# Patient Record
Sex: MALE | Race: WHITE | Employment: FULL TIME
[De-identification: names, ages, dates, MRNs, and addresses within clinical notes are randomized per-mention and may not be internally consistent; named-entity substitution may affect disease eponyms.]

---
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
## 2022-03-09 ENCOUNTER — APPOINTMENT (OUTPATIENT)
Dept: CT IMAGING | Age: 22
DRG: 135 | End: 2022-03-09
Payer: MEDICARE

## 2022-03-09 ENCOUNTER — HOSPITAL ENCOUNTER (INPATIENT)
Age: 22
LOS: 1 days | Discharge: HOME | DRG: 135 | End: 2022-03-10
Attending: EMERGENCY MEDICINE | Admitting: SURGERY
Payer: MEDICARE

## 2022-03-09 ENCOUNTER — APPOINTMENT (OUTPATIENT)
Dept: GENERAL RADIOLOGY | Age: 22
DRG: 135 | End: 2022-03-09
Payer: MEDICARE
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
## 2022-03-09 PROBLEM — J93.9 PNEUMOTHORAX: Status: ACTIVE | Noted: 2022-03-09

## 2022-03-09 LAB
ABO/RH: NORMAL
ANION GAP SERPL CALCULATED.3IONS-SCNC: 15 MMOL/L (ref 9–17)
ANTIBODY SCREEN: NEGATIVE
ARM BAND NUMBER: NORMAL
BLOOD BANK SPECIMEN: ABNORMAL
BUN BLDV-MCNC: 14 MG/DL (ref 6–20)
CARBOXYHEMOGLOBIN: 0.7 % (ref 0–5)
CHLORIDE BLD-SCNC: 104 MMOL/L (ref 98–107)
CO2: 21 MMOL/L (ref 20–31)
CREAT SERPL-MCNC: 1.05 MG/DL (ref 0.7–1.2)
ETHANOL PERCENT: <0.01 %
ETHANOL: <10 MG/DL
EXPIRATION DATE: NORMAL
FIO2: ABNORMAL
GLUCOSE BLD-MCNC: 119 MG/DL (ref 70–99)
HCG QUALITATIVE: ABNORMAL
HCO3 VENOUS: 22.1 MMOL/L (ref 24–30)
HCT VFR BLD CALC: 43.6 % (ref 40.7–50.3)
HEMOGLOBIN: 14.9 G/DL (ref 13–17)
INR BLD: 1.1
MCH RBC QN AUTO: 28.6 PG (ref 25.2–33.5)
MCHC RBC AUTO-ENTMCNC: 34.2 G/DL (ref 28.4–34.8)
MCV RBC AUTO: 83.7 FL (ref 82.6–102.9)
NEGATIVE BASE EXCESS, VEN: 2.2 MMOL/L (ref 0–2)
NRBC AUTOMATED: 0 PER 100 WBC
O2 SAT, VEN: 91.3 % (ref 60–85)
PARTIAL THROMBOPLASTIN TIME: 23.3 SEC (ref 20.5–30.5)
PATIENT TEMP: 37
PCO2, VEN: 38.3 (ref 39–55)
PDW BLD-RTO: 12.3 % (ref 11.8–14.4)
PH VENOUS: 7.38 (ref 7.32–7.42)
PLATELET # BLD: 231 K/UL (ref 138–453)
PMV BLD AUTO: 9.4 FL (ref 8.1–13.5)
PO2, VEN: 60.6 (ref 30–50)
POTASSIUM SERPL-SCNC: 3.2 MMOL/L (ref 3.7–5.3)
PROTHROMBIN TIME: 11.5 SEC (ref 9.1–12.3)
RBC # BLD: 5.21 M/UL (ref 4.21–5.77)
SARS-COV-2, RAPID: NOT DETECTED
SODIUM BLD-SCNC: 140 MMOL/L (ref 135–144)
SPECIMEN DESCRIPTION: NORMAL
WBC # BLD: 9.9 K/UL (ref 4.5–13.5)

## 2022-03-09 PROCEDURE — 73552 X-RAY EXAM OF FEMUR 2/>: CPT

## 2022-03-09 PROCEDURE — 6810039001 HC L1 TRAUMA PRIORITY

## 2022-03-09 PROCEDURE — 72125 CT NECK SPINE W/O DYE: CPT

## 2022-03-09 PROCEDURE — 6360000004 HC RX CONTRAST MEDICATION: Performed by: STUDENT IN AN ORGANIZED HEALTH CARE EDUCATION/TRAINING PROGRAM

## 2022-03-09 PROCEDURE — 1200000000 HC SEMI PRIVATE

## 2022-03-09 PROCEDURE — 73562 X-RAY EXAM OF KNEE 3: CPT

## 2022-03-09 PROCEDURE — 99285 EMERGENCY DEPT VISIT HI MDM: CPT

## 2022-03-09 PROCEDURE — 70450 CT HEAD/BRAIN W/O DYE: CPT

## 2022-03-09 PROCEDURE — 71260 CT THORAX DX C+: CPT

## 2022-03-09 PROCEDURE — 3209999900 CT THORACIC SPINE TRAUMA RECONSTRUCTION

## 2022-03-09 PROCEDURE — 3209999900 CT LUMBAR SPINE TRAUMA RECONSTRUCTION

## 2022-03-09 RX ORDER — ONDANSETRON 2 MG/ML
INJECTION INTRAMUSCULAR; INTRAVENOUS
Status: DISPENSED
Start: 2022-03-09 | End: 2022-03-10

## 2022-03-09 RX ORDER — ONDANSETRON 2 MG/ML
INJECTION INTRAMUSCULAR; INTRAVENOUS
Status: COMPLETED
Start: 2022-03-09 | End: 2022-03-10

## 2022-03-09 RX ORDER — FENTANYL CITRATE 50 UG/ML
INJECTION, SOLUTION INTRAMUSCULAR; INTRAVENOUS
Status: DISPENSED
Start: 2022-03-09 | End: 2022-03-10

## 2022-03-09 RX ADMIN — IOPAMIDOL 130 ML: 755 INJECTION, SOLUTION INTRAVENOUS at 21:48

## 2022-03-09 ASSESSMENT — ENCOUNTER SYMPTOMS
BACK PAIN: 0
COUGH: 0
ABDOMINAL PAIN: 1
SHORTNESS OF BREATH: 0
VOMITING: 0
NAUSEA: 0
CHEST TIGHTNESS: 0
SINUS PAIN: 0

## 2022-03-09 NOTE — LETTER
[facility]  [address] [facility] [place] [address]  Phone: [phone]        March 10, 2022     Patient: Mike Mendoza   YOB: 2000   Date of Visit: 3/9/2022       To Whom It May Concern: It is my medical opinion that Theresa Brink should remain out of work until Wednesday March 16th, 2022. May return sooner if symptoms have improved. If additional documentation is needed, please contact our office. [address] [facility], [address]  [phone] (V)  [phone] (f)      If you have any questions or concerns, please don't hesitate to call.     Sincerely,      Herlinda Cyr DO/Tico Means RN, Trauma Coordinator

## 2022-03-10 ENCOUNTER — APPOINTMENT (OUTPATIENT)
Dept: GENERAL RADIOLOGY | Age: 22
DRG: 135 | End: 2022-03-10
Payer: MEDICARE

## 2022-03-10 VITALS
RESPIRATION RATE: 16 BRPM | SYSTOLIC BLOOD PRESSURE: 117 MMHG | WEIGHT: 135 LBS | BODY MASS INDEX: 22.49 KG/M2 | OXYGEN SATURATION: 100 % | TEMPERATURE: 98.3 F | HEIGHT: 65 IN | DIASTOLIC BLOOD PRESSURE: 49 MMHG | HEART RATE: 105 BPM

## 2022-03-10 PROBLEM — J93.9 PNEUMOTHORAX: Status: RESOLVED | Noted: 2022-03-09 | Resolved: 2022-03-10

## 2022-03-10 LAB
ABSOLUTE EOS #: <0.03 K/UL (ref 0–0.44)
ABSOLUTE IMMATURE GRANULOCYTE: 0.03 K/UL (ref 0–0.3)
ABSOLUTE LYMPH #: 2.04 K/UL (ref 1.2–5.2)
ABSOLUTE MONO #: 1.03 K/UL (ref 0.1–1.4)
ANION GAP SERPL CALCULATED.3IONS-SCNC: 12 MMOL/L (ref 9–17)
BASOPHILS # BLD: 0 % (ref 0–2)
BASOPHILS ABSOLUTE: 0.04 K/UL (ref 0–0.2)
BUN BLDV-MCNC: 11 MG/DL (ref 6–20)
CALCIUM SERPL-MCNC: 9.6 MG/DL (ref 8.6–10.4)
CHLORIDE BLD-SCNC: 104 MMOL/L (ref 98–107)
CO2: 23 MMOL/L (ref 20–31)
CREAT SERPL-MCNC: 0.81 MG/DL (ref 0.7–1.2)
EOSINOPHILS RELATIVE PERCENT: 0 % (ref 1–4)
GFR NON-AFRICAN AMERICAN: ABNORMAL ML/MIN
GFR SERPL CREATININE-BSD FRML MDRD: ABNORMAL ML/MIN/{1.73_M2}
GLUCOSE BLD-MCNC: 102 MG/DL (ref 70–99)
HCT VFR BLD CALC: 46.6 % (ref 40.7–50.3)
HEMOGLOBIN: 15.4 G/DL (ref 13–17)
IMMATURE GRANULOCYTES: 0 %
LYMPHOCYTES # BLD: 19 % (ref 25–45)
MCH RBC QN AUTO: 28.9 PG (ref 25.2–33.5)
MCHC RBC AUTO-ENTMCNC: 33 G/DL (ref 28.4–34.8)
MCV RBC AUTO: 87.4 FL (ref 82.6–102.9)
MONOCYTES # BLD: 10 % (ref 2–8)
NRBC AUTOMATED: 0 PER 100 WBC
PDW BLD-RTO: 12.7 % (ref 11.8–14.4)
PLATELET # BLD: 214 K/UL (ref 138–453)
PMV BLD AUTO: 9.1 FL (ref 8.1–13.5)
POTASSIUM SERPL-SCNC: 4.2 MMOL/L (ref 3.7–5.3)
RBC # BLD: 5.33 M/UL (ref 4.21–5.77)
SEG NEUTROPHILS: 71 % (ref 34–64)
SEGMENTED NEUTROPHILS ABSOLUTE COUNT: 7.51 K/UL (ref 1.8–8)
SODIUM BLD-SCNC: 139 MMOL/L (ref 135–144)
WBC # BLD: 10.7 K/UL (ref 4.5–13.5)

## 2022-03-10 PROCEDURE — 97530 THERAPEUTIC ACTIVITIES: CPT

## 2022-03-10 PROCEDURE — 97535 SELF CARE MNGMENT TRAINING: CPT

## 2022-03-10 PROCEDURE — 80048 BASIC METABOLIC PNL TOTAL CA: CPT

## 2022-03-10 PROCEDURE — 6360000002 HC RX W HCPCS

## 2022-03-10 PROCEDURE — 71045 X-RAY EXAM CHEST 1 VIEW: CPT

## 2022-03-10 PROCEDURE — 92523 SPEECH SOUND LANG COMPREHEN: CPT

## 2022-03-10 PROCEDURE — 85025 COMPLETE CBC W/AUTO DIFF WBC: CPT

## 2022-03-10 PROCEDURE — 36415 COLL VENOUS BLD VENIPUNCTURE: CPT

## 2022-03-10 PROCEDURE — 97165 OT EVAL LOW COMPLEX 30 MIN: CPT

## 2022-03-10 PROCEDURE — 73030 X-RAY EXAM OF SHOULDER: CPT

## 2022-03-10 PROCEDURE — 97161 PT EVAL LOW COMPLEX 20 MIN: CPT

## 2022-03-10 PROCEDURE — 6370000000 HC RX 637 (ALT 250 FOR IP): Performed by: STUDENT IN AN ORGANIZED HEALTH CARE EDUCATION/TRAINING PROGRAM

## 2022-03-10 RX ORDER — METHOCARBAMOL 750 MG/1
750 TABLET, FILM COATED ORAL EVERY 6 HOURS
Status: DISCONTINUED | OUTPATIENT
Start: 2022-03-10 | End: 2022-03-10 | Stop reason: HOSPADM

## 2022-03-10 RX ORDER — SODIUM CHLORIDE 9 MG/ML
25 INJECTION, SOLUTION INTRAVENOUS PRN
Status: DISCONTINUED | OUTPATIENT
Start: 2022-03-10 | End: 2022-03-10 | Stop reason: HOSPADM

## 2022-03-10 RX ORDER — ACETAMINOPHEN 500 MG
1000 TABLET ORAL EVERY 8 HOURS PRN
Qty: 42 TABLET | Refills: 0 | Status: SHIPPED | OUTPATIENT
Start: 2022-03-10 | End: 2022-03-17

## 2022-03-10 RX ORDER — SENNA AND DOCUSATE SODIUM 50; 8.6 MG/1; MG/1
1 TABLET, FILM COATED ORAL 2 TIMES DAILY
Status: DISCONTINUED | OUTPATIENT
Start: 2022-03-10 | End: 2022-03-10 | Stop reason: HOSPADM

## 2022-03-10 RX ORDER — ONDANSETRON 4 MG/1
4 TABLET, ORALLY DISINTEGRATING ORAL EVERY 8 HOURS PRN
Status: DISCONTINUED | OUTPATIENT
Start: 2022-03-10 | End: 2022-03-10 | Stop reason: HOSPADM

## 2022-03-10 RX ORDER — POLYETHYLENE GLYCOL 3350 17 G/17G
17 POWDER, FOR SOLUTION ORAL DAILY
Status: DISCONTINUED | OUTPATIENT
Start: 2022-03-10 | End: 2022-03-10 | Stop reason: HOSPADM

## 2022-03-10 RX ORDER — SODIUM CHLORIDE 0.9 % (FLUSH) 0.9 %
5-40 SYRINGE (ML) INJECTION PRN
Status: DISCONTINUED | OUTPATIENT
Start: 2022-03-10 | End: 2022-03-10 | Stop reason: HOSPADM

## 2022-03-10 RX ORDER — GABAPENTIN 300 MG/1
300 CAPSULE ORAL EVERY 8 HOURS
Status: DISCONTINUED | OUTPATIENT
Start: 2022-03-10 | End: 2022-03-10 | Stop reason: HOSPADM

## 2022-03-10 RX ORDER — METHOCARBAMOL 750 MG/1
750 TABLET, FILM COATED ORAL 3 TIMES DAILY PRN
Qty: 21 TABLET | Refills: 0 | Status: SHIPPED | OUTPATIENT
Start: 2022-03-10 | End: 2022-03-17

## 2022-03-10 RX ORDER — ONDANSETRON 2 MG/ML
4 INJECTION INTRAMUSCULAR; INTRAVENOUS EVERY 6 HOURS PRN
Status: DISCONTINUED | OUTPATIENT
Start: 2022-03-10 | End: 2022-03-10 | Stop reason: HOSPADM

## 2022-03-10 RX ORDER — SODIUM CHLORIDE 0.9 % (FLUSH) 0.9 %
5-40 SYRINGE (ML) INJECTION EVERY 12 HOURS SCHEDULED
Status: DISCONTINUED | OUTPATIENT
Start: 2022-03-10 | End: 2022-03-10 | Stop reason: HOSPADM

## 2022-03-10 RX ORDER — GABAPENTIN 300 MG/1
300 CAPSULE ORAL 3 TIMES DAILY
Qty: 21 CAPSULE | Refills: 0 | Status: SHIPPED | OUTPATIENT
Start: 2022-03-10 | End: 2022-03-17

## 2022-03-10 RX ORDER — ACETAMINOPHEN 500 MG
1000 TABLET ORAL EVERY 8 HOURS SCHEDULED
Status: DISCONTINUED | OUTPATIENT
Start: 2022-03-10 | End: 2022-03-10 | Stop reason: HOSPADM

## 2022-03-10 RX ADMIN — GABAPENTIN 300 MG: 300 CAPSULE ORAL at 02:29

## 2022-03-10 RX ADMIN — ACETAMINOPHEN 1000 MG: 500 TABLET ORAL at 06:33

## 2022-03-10 RX ADMIN — ONDANSETRON 4 MG: 2 INJECTION INTRAMUSCULAR; INTRAVENOUS at 00:51

## 2022-03-10 RX ADMIN — DOCUSATE SODIUM 50 MG AND SENNOSIDES 8.6 MG 1 TABLET: 8.6; 5 TABLET, FILM COATED ORAL at 02:29

## 2022-03-10 RX ADMIN — METHOCARBAMOL TABLETS 750 MG: 750 TABLET, COATED ORAL at 02:29

## 2022-03-10 RX ADMIN — METHOCARBAMOL TABLETS 750 MG: 750 TABLET, COATED ORAL at 06:33

## 2022-03-10 ASSESSMENT — PAIN SCALES - GENERAL
PAINLEVEL_OUTOF10: 5
PAINLEVEL_OUTOF10: 6
PAINLEVEL_OUTOF10: 2
PAINLEVEL_OUTOF10: 6
PAINLEVEL_OUTOF10: 3

## 2022-03-10 ASSESSMENT — PAIN DESCRIPTION - LOCATION
LOCATION: NECK
LOCATION: NECK

## 2022-03-10 ASSESSMENT — PATIENT HEALTH QUESTIONNAIRE - PHQ9: SUM OF ALL RESPONSES TO PHQ QUESTIONS 1-9: 7

## 2022-03-10 ASSESSMENT — PAIN DESCRIPTION - PAIN TYPE: TYPE: ACUTE PAIN
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
## 2022-03-10 NOTE — CARE COORDINATION
Case Management Initial Discharge Plan  Francis Anaya,             Met with:patient & mother to discuss discharge plans. Information verified: address, contacts, phone number, , insurance Yes  Insurance Provider: Saeid adv and car insurance    Emergency Contact/Next of Kin name & number: mother Anderson at [phone]  Who are involved in patient's support system? Mother and father, per mother, \"goes back and forth to each parents house. \"    PCP: Suha Vitale, APRN - CNP  Date of last visit: last year, pt is changing doctor's to [facility]. Discharge Planning    Living Arrangements:  Parent     Home has 1 stories  3 stairs to climb to get into front door,   Location of bedroom/bathroom in home main floor couch    Patient able to perform ADL's:Independent    Current Services (outpatient & in home) none  DME equipment: n/a  DME provider: n/a    Is patient receiving oral anticoagulation therapy? No    If indicated:   Physician managing anticoagulation treatment: n/a  Where does patient obtain lab work for ATC treatment? n/a      Potential Assistance Needed:  N/A    Patient agreeable to home care: No  [place]andler of choice provided:  no    Prior SNF/Rehab Placement and Facility: none  Agreeable to SNF/Rehab: No  Chandler of choice provided: no     Evaluation: yes    Expected Discharge date:  03/10/22    Patient expects to be discharged to: If home: is the family and/or caregiver wiling & able to provide support at home? yes  Who will be providing this support? Mother and father, different residences    Follow Up Appointment: Best Day/ Time:      Transportation provider: Mother  Transportation arrangements needed for discharge: No    Readmission Risk              Risk of Unplanned Readmission:  6             Does patient have a readmission risk score greater than 14?: No  If yes, follow-up appointment must be made within 7 days of discharge.      Goals of Care:       Educated pt and
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
## 2022-03-10 NOTE — PROGRESS NOTES
CLINICAL PHARMACY NOTE: MEDS TO BEDS    Total # of Prescriptions Filled: 3   The following medications were delivered to the patient:  · Robaxin 750mg tablet  · Tylenol 500mg tablet  · Gabapentin 300mg capsules    Additional Documentation: medications delivered to the pt and mom in room 246 on 03.10.22 at 16:38, no co pay.
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
## 2022-03-10 NOTE — PLAN OF CARE
Problem: Pain:  Goal: Pain level will decrease  Description: Pain level will decrease  3/10/2022 1701 by Gene Bush RN  Outcome: Completed  3/10/2022 0346 by Dorys Salazar RN  Outcome: Ongoing  Goal: Control of acute pain  Description: Control of acute pain  3/10/2022 1701 by Gene Bush RN  Outcome: Completed  3/10/2022 0346 by Dorys Salazar RN  Outcome: Ongoing  Goal: Control of chronic pain  Description: Control of chronic pain  3/10/2022 1701 by Gene Bush RN  Outcome: Completed  3/10/2022 0346 by Dorys Salazar RN  Outcome: Ongoing     Problem: Falls - Risk of:  Goal: Will remain free from falls  Description: Will remain free from falls  3/10/2022 1701 by Gene Bush RN  Outcome: Completed  3/10/2022 0346 by Dorys Salazar RN  Outcome: Met This Shift  Goal: Absence of physical injury  Description: Absence of physical injury  3/10/2022 1701 by Gene Bush RN  Outcome: Completed  3/10/2022 0346 by Dorys Salazar RN  Outcome: Met This Shift     Problem: Musculor/Skeletal Functional Status  Goal: Highest potential functional level  Outcome: Completed
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
## 2023-01-20 ENCOUNTER — OFFICE VISIT (OUTPATIENT)
Dept: PRIMARY CARE CLINIC | Age: 23
End: 2023-01-20
Payer: MEDICARE

## 2023-01-20 VITALS
HEART RATE: 68 BPM | OXYGEN SATURATION: 100 % | SYSTOLIC BLOOD PRESSURE: 116 MMHG | DIASTOLIC BLOOD PRESSURE: 66 MMHG | WEIGHT: 138.6 LBS | BODY MASS INDEX: 23.06 KG/M2

## 2023-01-20 DIAGNOSIS — Z76.89 ENCOUNTER TO ESTABLISH CARE: ICD-10-CM

## 2023-01-20 DIAGNOSIS — F41.8 ANXIETY WITH DEPRESSION: Primary | ICD-10-CM

## 2023-01-20 PROCEDURE — G8420 CALC BMI NORM PARAMETERS: HCPCS | Performed by: NURSE PRACTITIONER

## 2023-01-20 PROCEDURE — 99203 OFFICE O/P NEW LOW 30 MIN: CPT | Performed by: NURSE PRACTITIONER

## 2023-01-20 PROCEDURE — G8484 FLU IMMUNIZE NO ADMIN: HCPCS | Performed by: NURSE PRACTITIONER

## 2023-01-20 PROCEDURE — G8427 DOCREV CUR MEDS BY ELIG CLIN: HCPCS | Performed by: NURSE PRACTITIONER

## 2023-01-20 PROCEDURE — 1036F TOBACCO NON-USER: CPT | Performed by: NURSE PRACTITIONER

## 2023-01-20 RX ORDER — LANOLIN ALCOHOL/MO/W.PET/CERES
CREAM (GRAM) TOPICAL
COMMUNITY
Start: 2009-10-06

## 2023-01-20 RX ORDER — CITALOPRAM 10 MG/1
10 TABLET ORAL DAILY
Qty: 30 TABLET | Refills: 1 | Status: SHIPPED | OUTPATIENT
Start: 2023-01-20

## 2023-01-20 SDOH — ECONOMIC STABILITY: FOOD INSECURITY: WITHIN THE PAST 12 MONTHS, THE FOOD YOU BOUGHT JUST DIDN'T LAST AND YOU DIDN'T HAVE MONEY TO GET MORE.: NEVER TRUE

## 2023-01-20 SDOH — ECONOMIC STABILITY: FOOD INSECURITY: WITHIN THE PAST 12 MONTHS, YOU WORRIED THAT YOUR FOOD WOULD RUN OUT BEFORE YOU GOT MONEY TO BUY MORE.: NEVER TRUE

## 2023-01-20 ASSESSMENT — PATIENT HEALTH QUESTIONNAIRE - PHQ9
2. FEELING DOWN, DEPRESSED OR HOPELESS: 1
1. LITTLE INTEREST OR PLEASURE IN DOING THINGS: 1
SUM OF ALL RESPONSES TO PHQ QUESTIONS 1-9: 2
SUM OF ALL RESPONSES TO PHQ9 QUESTIONS 1 & 2: 2
SUM OF ALL RESPONSES TO PHQ QUESTIONS 1-9: 2

## 2023-01-20 ASSESSMENT — SOCIAL DETERMINANTS OF HEALTH (SDOH): HOW HARD IS IT FOR YOU TO PAY FOR THE VERY BASICS LIKE FOOD, HOUSING, MEDICAL CARE, AND HEATING?: NOT HARD AT ALL

## 2023-01-20 NOTE — LETTER
[facility]  [address] [address]  Phone: [phone]  Fax: [phone] [facility], APRN - CNP        January 20, 2023     Patient: Zonia Araujo   YOB: 2000   Date of Visit: 1/20/2023       To Whom it May Concern:    Rock Marrufo was seen in my clinic on 1/20/2023. If you have any questions or concerns, please don't hesitate to call.     Sincerely,         HEIDE Irby - CNP

## 2023-01-20 NOTE — PROGRESS NOTES
[facility] PRIMARY CARE  [phone] [address] [address]  Dept: [phone]  Dept Fax: [phone]    Rima Hoyt (:  2000) is a 23 y.o. [facility] patient, here for evaluation of the following chief complaint(s):  New Patient (Get established)         ASSESSMENT/PLAN:  1. Anxiety with depression  -     citalopram (CELEXA) 10 MG tablet; Take 1 tablet by mouth daily 1/2 tablet once a day for 7 days increase to a whole tablet daily, Disp-30 tablet, R-1Normal  2. Encounter to establish care    Agreed to restart citalopram as Lila Larkin believes he responded well as a teenager when taking the medication. He is to keep his appointment at the [facility] in 3 weeks for further evaluation as well as counseling services. We did discuss keeping safety measures in place if he is feeling suicidal and has a plan, that he should bring himself to the emergency room or contact his sister who he feels safe speaking to and obtain immediate help. Return in about 4 weeks (around 2023) for Anxiety, Depression. Subjective   SUBJECTIVE/OBJECTIVE:  Rima Hoyt is a 74-year-old male here today to establish care. He endorses a history of migraine with Chiari malformation and ADHD as well as anxiety with depression. Worsening anxiety and depression, diagnosed with anxiety as a kid. Did take citalopram in the past which she found helpful    Feels these have gotten worse in the last year. Jj missed episodes of increased anger, was sent home from work for a week after \"erupting\" at his boss. He has had worsening days where he feels very down and depressed. Getting to the point where he is locked himself in his room and turned his phone often cried. Lila Larkin does admit to thoughts of suicide, denies a plan and believes he would never follow through with any suicidal action as he knows it would traumatize his family and friends.   He denies any drug use, does use a vape pen with nicotine. While Lillie Avila says his alcohol use is rare and intermittent, he has had days where he has drank to \"blackout\" in order to cope with his depression    Did go to [address] [place] [address] and had an intake, has an appointment to see psychiatry in the next 3 to 4 weeks      Review of Systems   Psychiatric/Behavioral:  Positive for agitation, dysphoric mood and suicidal ideas (no plan). Negative for self-injury. The patient is nervous/anxious. Objective     DIAGNOSTIC FINDINGS:  CBC:  Lab Results   Component Value Date/Time    WBC 10.7 03/10/2022 07:03 AM    HGB 15.4 03/10/2022 07:03 AM     03/10/2022 07:03 AM       BMP:    Lab Results   Component Value Date/Time     03/10/2022 07:03 AM    K 4.2 03/10/2022 07:03 AM     03/10/2022 07:03 AM    CO2 23 03/10/2022 07:03 AM    BUN 11 03/10/2022 07:03 AM    CREATININE 0.81 03/10/2022 07:03 AM    GLUCOSE 102 03/10/2022 07:03 AM       HEMOGLOBIN A1C: No results found for: LABA1C    FASTING LIPID PANEL:No results found for: CHOL, HDL, TRIG    Physical Exam  Constitutional:       Appearance: Normal appearance. He is not toxic-appearing. HENT:      Head: Normocephalic. Right Ear: External ear normal.      Left Ear: External ear normal.      Nose: Nose normal.      Mouth/Throat:      Mouth: Mucous membranes are moist.   Eyes:      General: No scleral icterus. Right eye: No discharge. Left eye: No discharge. Conjunctiva/sclera: Conjunctivae normal.   Pulmonary:      Effort: Pulmonary effort is normal.   Musculoskeletal:      Cervical back: Normal range of motion. Skin:     Coloration: Skin is not jaundiced. Neurological:      Mental Status: He is alert and oriented to person, place, and time. Psychiatric:         Mood and Affect: Mood normal.         Behavior: Behavior normal.         Thought Content:  Thought content normal.          An electronic signature was used to authenticate this note.     --Harper Kinney, APRN - CNP

## 2023-02-14 ENCOUNTER — OFFICE VISIT (OUTPATIENT)
Dept: PRIMARY CARE CLINIC | Age: 23
End: 2023-02-14
Payer: MEDICAID

## 2023-02-14 VITALS
DIASTOLIC BLOOD PRESSURE: 64 MMHG | OXYGEN SATURATION: 100 % | TEMPERATURE: 98.4 F | BODY MASS INDEX: 22.39 KG/M2 | WEIGHT: 134.4 LBS | HEIGHT: 65 IN | HEART RATE: 65 BPM | SYSTOLIC BLOOD PRESSURE: 112 MMHG | RESPIRATION RATE: 22 BRPM

## 2023-02-14 DIAGNOSIS — K52.9 GASTROENTERITIS: Primary | ICD-10-CM

## 2023-02-14 DIAGNOSIS — S76.212D INGUINAL STRAIN, LEFT, SUBSEQUENT ENCOUNTER: ICD-10-CM

## 2023-02-14 PROCEDURE — 99213 OFFICE O/P EST LOW 20 MIN: CPT | Performed by: NURSE PRACTITIONER

## 2023-02-14 SDOH — ECONOMIC STABILITY: FOOD INSECURITY: WITHIN THE PAST 12 MONTHS, THE FOOD YOU BOUGHT JUST DIDN'T LAST AND YOU DIDN'T HAVE MONEY TO GET MORE.: NEVER TRUE

## 2023-02-14 SDOH — ECONOMIC STABILITY: INCOME INSECURITY: HOW HARD IS IT FOR YOU TO PAY FOR THE VERY BASICS LIKE FOOD, HOUSING, MEDICAL CARE, AND HEATING?: NOT HARD AT ALL

## 2023-02-14 SDOH — ECONOMIC STABILITY: HOUSING INSECURITY
IN THE LAST 12 MONTHS, WAS THERE A TIME WHEN YOU DID NOT HAVE A STEADY PLACE TO SLEEP OR SLEPT IN A SHELTER (INCLUDING NOW)?: NO

## 2023-02-14 SDOH — ECONOMIC STABILITY: FOOD INSECURITY: WITHIN THE PAST 12 MONTHS, YOU WORRIED THAT YOUR FOOD WOULD RUN OUT BEFORE YOU GOT MONEY TO BUY MORE.: NEVER TRUE

## 2023-02-14 ASSESSMENT — ENCOUNTER SYMPTOMS
VOMITING: 1
COUGH: 0

## 2023-02-14 NOTE — LETTER
[facility]  [address]158 [address] [address]  Phone: [phone]  Fax: [phone] [facility], APRN - CNP        February 14, 2023     Patient: Bry Saunders   YOB: 2000   Date of Visit: 2/14/2023       To Whom it May Concern:    Charbel Dow was seen in my clinic on 2/14/2023. He may return to work on 2/15/23, excused on 2/14/23. .    If you have any questions or concerns, please don't hesitate to call.     Sincerely,         Raine Goldstein, HEIDE - CNP

## 2023-02-14 NOTE — PROGRESS NOTES
[address] PRIMARY CARE  0030 036 [address] [address]  Dept: [phone]  Dept Fax: [phone]    Yann Guan (:  2000) is a 23 y.o. male,Established patient, here for evaluation of the following chief complaint(s):  Leg Pain (Possible tore muscle. /Went to ED on 2023./) and Emesis (Started early this morning. Last time was 530am. No chills, abdominal pain, fever. Pt states he is feeling better. /)         ASSESSMENT/PLAN:  1. Gastroenteritis  2. Inguinal strain, left, subsequent encounter    Patient tolerating fluids, educated on brat diet and may return to work tomorrow. Continue with citalopram and follow-up in 4 weeks to reevaluate anxiety  Encouraged to use appropriate body mechanics to protect from injury when lifting at work    Return in about 4 weeks (around 3/14/2023) for Anxiety. Subjective   SUBJECTIVE/OBJECTIVE:  Missed work today, started throwing up last night round midnight, no diarrhea. Mild chills, no know fever. Did have abd pains, but it got better. Missed work today, asking for a note  Tolerating water and Gatorade. Groin pain has resolved from ER visit. No further left-sided groin pain. Denies any urinary changes, no bulging present. Emesis   This is a new problem. The current episode started today. The problem has been gradually improving. There has been no fever. Associated symptoms include chills. Pertinent negatives include no coughing, fever or headaches. Review of Systems   Constitutional:  Positive for chills. Negative for fever. Respiratory:  Negative for cough. Gastrointestinal:  Positive for vomiting. Neurological:  Negative for headaches.         Objective     DIAGNOSTIC FINDINGS:  CBC:  Lab Results   Component Value Date/Time    WBC 10.7 03/10/2022 07:03 AM    HGB 15.4 03/10/2022 07:03 AM     03/10/2022 07:03 AM       BMP:    Lab Results   Component Value Date/Time     03/10/2022 07:03 AM    K 4.2 03/10/2022 07:03 AM     03/10/2022 07:03 AM    CO2 23 03/10/2022 07:03 AM    BUN 11 03/10/2022 07:03 AM    CREATININE 0.81 03/10/2022 07:03 AM    GLUCOSE 102 03/10/2022 07:03 AM       HEMOGLOBIN A1C: No results found for: LABA1C    FASTING LIPID PANEL:No results found for: CHOL, HDL, TRIG    Physical Exam  Vitals and nursing note reviewed. Constitutional:       General: He is not in acute distress. Appearance: He is well-developed. HENT:      Head: Normocephalic. Eyes:      General: No scleral icterus. Pupils: Pupils are equal, round, and reactive to light. Cardiovascular:      Rate and Rhythm: Normal rate and regular rhythm. Pulmonary:      Effort: Pulmonary effort is normal.      Breath sounds: Normal breath sounds. Abdominal:      General: Bowel sounds are normal.      Palpations: Abdomen is soft. Tenderness: There is no abdominal tenderness. There is no guarding. Musculoskeletal:      Cervical back: Normal range of motion and neck supple. Skin:     General: Skin is warm and dry. Neurological:      Mental Status: He is alert and oriented to person, place, and time. Coordination: Coordination normal.   Psychiatric:         Behavior: Behavior normal. Behavior is cooperative. Thought Content: Thought content normal.         Judgment: Judgment normal.          An electronic signature was used to authenticate this note.     --Noa Weathers, APRN - CNP

## 2023-02-28 ENCOUNTER — HOSPITAL ENCOUNTER (INPATIENT)
Age: 23
LOS: 2 days | Discharge: HOME | End: 2023-03-03
Attending: EMERGENCY MEDICINE | Admitting: PSYCHIATRY & NEUROLOGY
Payer: COMMERCIAL

## 2023-02-28 DIAGNOSIS — F32.A DEPRESSION WITH SUICIDAL IDEATION: Primary | ICD-10-CM

## 2023-02-28 DIAGNOSIS — R45.851 DEPRESSION WITH SUICIDAL IDEATION: Primary | ICD-10-CM

## 2023-02-28 LAB
ABSOLUTE EOS #: 0.1 K/UL (ref 0–0.4)
ABSOLUTE LYMPH #: 2.6 K/UL (ref 1.2–5.2)
ABSOLUTE MONO #: 0.7 K/UL (ref 0.1–1.3)
ALBUMIN SERPL-MCNC: 5.2 G/DL (ref 3.5–5.2)
ALP SERPL-CCNC: 103 U/L (ref 40–129)
ALT SERPL-CCNC: 16 U/L (ref 5–41)
ANION GAP SERPL CALCULATED.3IONS-SCNC: 11 MMOL/L (ref 9–17)
AST SERPL-CCNC: 18 U/L
BASOPHILS # BLD: 1 % (ref 0–2)
BASOPHILS ABSOLUTE: 0.1 K/UL (ref 0–0.2)
BILIRUB SERPL-MCNC: 0.6 MG/DL (ref 0.3–1.2)
BUN SERPL-MCNC: 12 MG/DL (ref 6–20)
CALCIUM SERPL-MCNC: 10.1 MG/DL (ref 8.6–10.4)
CHLORIDE SERPL-SCNC: 100 MMOL/L (ref 98–107)
CO2 SERPL-SCNC: 27 MMOL/L (ref 20–31)
CREAT SERPL-MCNC: 0.83 MG/DL (ref 0.7–1.2)
EOSINOPHILS RELATIVE PERCENT: 1 % (ref 0–4)
ETHANOL PERCENT: <0.01 %
ETHANOL: <10 MG/DL
GFR SERPL CREATININE-BSD FRML MDRD: >60 ML/MIN/1.73M2
GLUCOSE SERPL-MCNC: 83 MG/DL (ref 70–99)
HCT VFR BLD AUTO: 49.1 % (ref 41–53)
HGB BLD-MCNC: 16.5 G/DL (ref 13.5–17.5)
LYMPHOCYTES # BLD: 27 % (ref 25–45)
MCH RBC QN AUTO: 28.6 PG (ref 26–34)
MCHC RBC AUTO-ENTMCNC: 33.7 G/DL (ref 31–37)
MCV RBC AUTO: 84.8 FL (ref 80–100)
MONOCYTES # BLD: 7 % (ref 2–8)
PDW BLD-RTO: 14.2 % (ref 11.5–14.9)
PLATELET # BLD AUTO: 264 K/UL (ref 150–450)
PMV BLD AUTO: 7.1 FL (ref 6–12)
POTASSIUM SERPL-SCNC: 3.6 MMOL/L (ref 3.7–5.3)
PROT SERPL-MCNC: 7.8 G/DL (ref 6.4–8.3)
RBC # BLD: 5.78 M/UL (ref 4.5–5.9)
SEG NEUTROPHILS: 64 % (ref 34–64)
SEGMENTED NEUTROPHILS ABSOLUTE COUNT: 6.2 K/UL (ref 1.3–9.1)
SODIUM SERPL-SCNC: 138 MMOL/L (ref 135–144)
WBC # BLD AUTO: 9.6 K/UL (ref 4.5–13.5)

## 2023-02-28 PROCEDURE — 36415 COLL VENOUS BLD VENIPUNCTURE: CPT

## 2023-02-28 PROCEDURE — 80179 DRUG ASSAY SALICYLATE: CPT

## 2023-02-28 PROCEDURE — 85025 COMPLETE CBC W/AUTO DIFF WBC: CPT

## 2023-02-28 PROCEDURE — G0480 DRUG TEST DEF 1-7 CLASSES: HCPCS

## 2023-02-28 PROCEDURE — 99285 EMERGENCY DEPT VISIT HI MDM: CPT

## 2023-02-28 PROCEDURE — 80053 COMPREHEN METABOLIC PANEL: CPT

## 2023-02-28 PROCEDURE — 80143 DRUG ASSAY ACETAMINOPHEN: CPT

## 2023-02-28 ASSESSMENT — PAIN - FUNCTIONAL ASSESSMENT: PAIN_FUNCTIONAL_ASSESSMENT: NONE - DENIES PAIN

## 2023-03-01 PROBLEM — F33.2 SEVERE RECURRENT MAJOR DEPRESSION WITHOUT PSYCHOTIC FEATURES (HCC): Status: ACTIVE | Noted: 2023-03-01

## 2023-03-01 PROBLEM — R45.851 DEPRESSION WITH SUICIDAL IDEATION: Status: ACTIVE | Noted: 2023-03-01

## 2023-03-01 PROBLEM — F32.A DEPRESSION WITH SUICIDAL IDEATION: Status: ACTIVE | Noted: 2023-03-01

## 2023-03-01 PROBLEM — G47.09 OTHER INSOMNIA: Status: ACTIVE | Noted: 2023-03-01

## 2023-03-01 LAB
ACETAMINOPHEN LEVEL: <5 UG/ML (ref 10–30)
SALICYLATE LEVEL: <1 MG/DL (ref 3–10)

## 2023-03-01 PROCEDURE — 99222 1ST HOSP IP/OBS MODERATE 55: CPT | Performed by: INTERNAL MEDICINE

## 2023-03-01 PROCEDURE — 6370000000 HC RX 637 (ALT 250 FOR IP): Performed by: PSYCHIATRY & NEUROLOGY

## 2023-03-01 PROCEDURE — 1240000000 HC EMOTIONAL WELLNESS R&B

## 2023-03-01 RX ORDER — MIRTAZAPINE 15 MG/1
7.5 TABLET, FILM COATED ORAL NIGHTLY
Status: DISCONTINUED | OUTPATIENT
Start: 2023-03-01 | End: 2023-03-03 | Stop reason: HOSPADM

## 2023-03-01 RX ORDER — MAGNESIUM HYDROXIDE/ALUMINUM HYDROXICE/SIMETHICONE 120; 1200; 1200 MG/30ML; MG/30ML; MG/30ML
30 SUSPENSION ORAL EVERY 6 HOURS PRN
Status: DISCONTINUED | OUTPATIENT
Start: 2023-03-01 | End: 2023-03-03 | Stop reason: HOSPADM

## 2023-03-01 RX ORDER — POLYETHYLENE GLYCOL 3350 17 G/17G
17 POWDER, FOR SOLUTION ORAL DAILY PRN
Status: DISCONTINUED | OUTPATIENT
Start: 2023-03-01 | End: 2023-03-03 | Stop reason: HOSPADM

## 2023-03-01 RX ORDER — IBUPROFEN 400 MG/1
400 TABLET ORAL EVERY 6 HOURS PRN
Status: DISCONTINUED | OUTPATIENT
Start: 2023-03-01 | End: 2023-03-03 | Stop reason: HOSPADM

## 2023-03-01 RX ORDER — HYDROXYZINE 50 MG/1
50 TABLET, FILM COATED ORAL 3 TIMES DAILY PRN
Status: DISCONTINUED | OUTPATIENT
Start: 2023-03-01 | End: 2023-03-03 | Stop reason: HOSPADM

## 2023-03-01 RX ORDER — ACETAMINOPHEN 325 MG/1
650 TABLET ORAL EVERY 4 HOURS PRN
Status: DISCONTINUED | OUTPATIENT
Start: 2023-03-01 | End: 2023-03-03 | Stop reason: HOSPADM

## 2023-03-01 RX ORDER — TRAZODONE HYDROCHLORIDE 50 MG/1
50 TABLET ORAL NIGHTLY PRN
Status: DISCONTINUED | OUTPATIENT
Start: 2023-03-01 | End: 2023-03-03 | Stop reason: HOSPADM

## 2023-03-01 RX ORDER — IBUPROFEN 600 MG/1
600 TABLET ORAL EVERY 6 HOURS PRN
COMMUNITY

## 2023-03-01 RX ADMIN — NICOTINE POLACRILEX 2 MG: 2 GUM, CHEWING BUCCAL at 17:26

## 2023-03-01 RX ADMIN — MIRTAZAPINE 7.5 MG: 15 TABLET, FILM COATED ORAL at 20:45

## 2023-03-01 ASSESSMENT — ENCOUNTER SYMPTOMS
SHORTNESS OF BREATH: 0
VOMITING: 0
ABDOMINAL PAIN: 0
NAUSEA: 0
COUGH: 0
RHINORRHEA: 0

## 2023-03-01 ASSESSMENT — LIFESTYLE VARIABLES
HOW OFTEN DO YOU HAVE A DRINK CONTAINING ALCOHOL: NEVER
HOW MANY STANDARD DRINKS CONTAINING ALCOHOL DO YOU HAVE ON A TYPICAL DAY: PATIENT DOES NOT DRINK

## 2023-03-01 ASSESSMENT — SLEEP AND FATIGUE QUESTIONNAIRES
SLEEP PATTERN: DIFFICULTY FALLING ASLEEP;INSOMNIA;RESTLESSNESS
AVERAGE NUMBER OF SLEEP HOURS: 8
DO YOU HAVE DIFFICULTY SLEEPING: YES
DO YOU USE A SLEEP AID: YES

## 2023-03-01 ASSESSMENT — PATIENT HEALTH QUESTIONNAIRE - PHQ9: SUM OF ALL RESPONSES TO PHQ QUESTIONS 1-9: 21

## 2023-03-01 NOTE — PROGRESS NOTES
Behavioral Services  Medicare Certification Upon Admission    I certify that this patient's inpatient psychiatric hospital admission is medically necessary for:    [x] (1) Treatment which could reasonably be expected to improve this patient's condition,       [x] (2) Or for diagnostic study;     AND     [x](2) The inpatient psychiatric services are provided while the individual is under the care of a physician and are included in the individualized plan of care.     Estimated length of stay/service 2-9 days    Plan for post-hospital care -outpatient care    Electronically signed by Alen Baird MD on 3/1/2023 at 9:33 AM
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
## 2023-03-01 NOTE — ED PROVIDER NOTES
[address]      Pt Name: Toyin Rios  MRN: [id number]  [place] 2000  Date of evaluation: 3/1/23      CHIEF COMPLAINT       Chief Complaint   Patient presents with    Psychiatric Evaluation     HISTORY OF PRESENT ILLNESS   HPI 23 y.o. male presents with c/o depression and suicidal thoughts. The patient was brought to the emergency department by his familiy. The patient reports feeling depressed and having thought of suicide for the last few months. The patient has been thinking of driving his truck off the road. The patient denies a h/o of previous suicide attempt. The patient unsure what has provoked their symtpoms. The patient denies a h/o of previous psychiatric admission. The patient denies drug use, denies attempts at self harm to this point. The patient denies medical complaints at this time. REVIEW OF SYSTEMS     Review of Systems   Constitutional:  Negative for fever. HENT:  Negative for congestion and rhinorrhea. Respiratory:  Negative for cough and shortness of breath. Gastrointestinal:  Negative for abdominal pain, nausea and vomiting. Skin:  Negative for rash and wound. Psychiatric/Behavioral:  Positive for dysphoric mood and suicidal ideas. PAST MEDICAL HISTORY     Past Medical History:   Diagnosis Date    Anxiety and depression     Attention deficit hyperactivity disorder (ADHD), combined type     History of Chiari malformation        SURGICAL HISTORY     History reviewed. No pertinent surgical history.     CURRENT MEDICATIONS       Current Discharge Medication List        CONTINUE these medications which have NOT CHANGED    Details   ibuprofen (ADVIL;MOTRIN) 600 MG tablet Take 600 mg by mouth every 6 hours as needed for Pain      melatonin 3 MG TABS tablet 1 tablet DAILY for 30 days      acetaminophen (TYLENOL) 500 MG tablet Take 2 tablets by mouth every 8 hours as needed for Pain  Qty: 42 tablet, Refills: 0             ALLERGIES     has No Known Allergies. FAMILY HISTORY     He indicated that his mother is alive. He indicated that his father is alive. He indicated that his sister is alive. He indicated that all of his four brothers are alive. He indicated that his maternal grandmother is alive. He indicated that his maternal grandfather is . He indicated that his paternal grandmother is alive. He indicated that his paternal grandfather is alive. He indicated that the status of his neg hx is unknown. SOCIAL HISTORY      reports that he has never smoked. He has never used smokeless tobacco. He reports that he does not drink alcohol and does not use drugs. PHYSICAL EXAM     INITIAL VITALS: BP (!) 100/49   Pulse 58   Temp 98 °F (36.7 °C)   Resp 14   Ht 5' 5\" (1.651 m)   Wt 134 lb (60.8 kg)   SpO2 100%   BMI 22.30 kg/m²   Gen: NAD  Head: Normocephalic, atraumatic  Eye: Pupils equal round reactive to light, no conjunctivitis  Heart: Regular rate and rhythm no murmurs  Lungs: Clear to auscultation bilaterally, no respiratory distress  Chest wall: No crepitus, no tenderness palpation  Abdomen: Soft, nontender, nondistended, with no peritoneal signs  Skin: No diaphoresis. no lacerations. Neurologic: Patient is alert and oriented x3, motor and sensation is intact in all 4 extremities, speech is fluent  Extremities: Full range of motion, no cyanosis, no edema, no signs of trauma, no tenderness to palpation    MEDICAL DECISION MAKING:     MDM    23 y.o. male with depression, suicidal thoughts,  suicidal plan. The patient does not appear intoxicated. The patient is showing no signs of any acute active toxidrome. The patient denies any overdose attempt or  medical complaints at this time. We screened for any acetaminophen or salicylate ingestion, and this returned negative. We also checked baseline renal function, liver function, a drug screen, cbc and there were no significant abnorlaities in these studies.   The patient is medically stable for psychiatric evaluation. The  evaluated the patient, I discussed the case with the . The case was also discussed with the psychiatry service and the patient was accepted for admission to the [facility]. DIAGNOSTIC RESULTS     LABS: All lab results were reviewed by myself, and all abnormals are listed below. Labs Reviewed   COMPREHENSIVE METABOLIC PANEL - Abnormal; Notable for the following components:       Result Value    Potassium 3.6 (*)     All other components within normal limits   ACETAMINOPHEN LEVEL - Abnormal; Notable for the following components:    Acetaminophen Level <5 (*)     All other components within normal limits   SALICYLATE LEVEL - Abnormal; Notable for the following components:    Salicylate Lvl <1 (*)     All other components within normal limits   CBC WITH AUTO DIFFERENTIAL   ETHANOL       EMERGENCY DEPARTMENT COURSE:   Vitals:    Vitals:    02/28/23 2315 03/01/23 0147 03/01/23 0200 03/01/23 0800   BP: (!) 148/95 120/82  (!) 100/49   Pulse: (!) 112 67 67 58   Resp: 14 14  14   Temp: 98.1 °F (36.7 °C) 98.6 °F (37 °C)  98 °F (36.7 °C)   TempSrc: Oral Oral     SpO2: 99%   100%   Weight: 134 lb (60.8 kg) 134 lb (60.8 kg)     Height:  5' 5\" (1.651 m)         The patient was given the following medications while in the emergency department:  Orders Placed This Encounter   Medications    acetaminophen (TYLENOL) tablet 650 mg    aluminum & magnesium hydroxide-simethicone (MAALOX) 200-200-20 MG/5ML suspension 30 mL    hydrOXYzine HCl (ATARAX) tablet 50 mg    ibuprofen (ADVIL;MOTRIN) tablet 400 mg    nicotine polacrilex (NICORETTE) gum 2 mg    traZODone (DESYREL) tablet 50 mg    polyethylene glycol (GLYCOLAX) packet 17 g    mirtazapine (REMERON) tablet 7.5 mg     -------------------------  CRITICAL CARE:   CONSULTS: IP CONSULT TO INTERNAL MEDICINE  PROCEDURES: Procedures     FINAL IMPRESSION      1.  Depression with suicidal ideation          DISPOSITION/PLAN DISPOSITION Decision To Admit 03/01/2023 02:15:53 PM      PATIENT REFERRED TO:  No follow-up provider specified.     DISCHARGE MEDICATIONS:  Current Discharge Medication List            Natalie Spears MD  Attending Emergency Physician                      Natalie Spears MD  03/01/23 Ctra. Horace Ornelas MD  03/01/23 [id number]
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
## 2023-03-01 NOTE — PLAN OF CARE
Problem: Self Harm/Suicidality  Goal: Will have no self-injury during hospital stay  Description: INTERVENTIONS:  1. Ensure constant observer at bedside with Q15M safety checks  2. Maintain a safe environment  3. Secure patient belongings  4. Ensure family/visitors adhere to safety recommendations  5. Ensure safety tray has been added to patient's diet order  6. Every shift and PRN: Re-assess suicidal risk via Frequent Screener    3/1/2023 1317 by Scott Egan  Outcome: Progressing   Pt denies any current suicidal thoughts. No scheduled medications at this time. Sits in dayroom and is social with peers. Pt. Remains safe on the unit. Q 15 minute checks for safety maintained. Problem: Depression  Goal: Will be euthymic at discharge  Description: INTERVENTIONS:  1. Administer medication as ordered  2. Provide emotional support via 1:1 interaction with staff  3. Encourage involvement in milieu/groups/activities  4. Monitor for social isolation  3/1/2023 1317 by Scott Egan  Outcome: Progressing   Pt relates to feeling depressed. Flat and sad on approach. Promote mediation compliance. Offer support and reassurance.

## 2023-03-01 NOTE — BH NOTE
Patient Education - Tobacco Cessation      Patient given tobacco quitline number 11774374267 at this time, refusing to call at this time, states \" I just dont want to quit now\"- patient given information as to the dangers of long term tobacco use. Continue to reinforce the importance of tobacco cessation.
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
## 2023-03-01 NOTE — GROUP NOTE
Group Therapy Note    Date: 3/1/2023    Group Start Time: 0900  Group End Time: 0920  Group Topic: Community Meeting    ALBERTO ASHLEY BELKIS Angulo        Group Therapy Note    Attendees: 6/21       Patient's Goal:  Talk with doctor about my medication    Notes:  guarded    Status After Intervention:  unchanged    Participation Level: Active Listener    Participation Quality: Appropriate      Speech:  normal      Thought Process/Content: Logical      Affective Functioning: Congruent      Mood: anxious and depressed      Level of consciousness:  Oriented x4      Response to Learning: Able to verbalize current knowledge/experience and Progressing to goal      Endings: None Reported    Modes of Intervention: Education, Support, and Socialization      Discipline Responsible: Behavorial Health Tech      Signature:   Delaney nAgulo
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
## 2023-03-01 NOTE — ED NOTES
Safeguard in [facility] for patient watch. Safeguard informed that they need to stay with the patient at all time, must be present in the room and if they need a break or relief to let the nurse know so they can be replaced. Safeguard verbalizes understanding. Belongings and patient checked by security. Belongings locked up. Pt in blue gown. Mode of arrival (squad #, walk in, police, etc) : Walk In        Chief complaint(s): Mental Health Evaluation        Arrival Note (brief scenario, treatment PTA, etc). : Pt arrived to ED voluntarily due to having suicidal thoughts with no plan. Pt expresses he's been feeling depressed with no motivation to even get out of bed most days thoughts that pt would e better off dead. PT expresses problems within relationship with girlfriend and family as stressors triggering today's visit. Pt denies the use of drugs and alcohol at this time. Pt denies visual but does at admit to audio hallucinations that talk bad about himself. C= \"Have you ever felt that you should Cut down on your drinking? \"  No  A= \"Have people Annoyed you by criticizing your drinking? \"  No  G= \"Have you ever felt bad or Guilty about your drinking? \"  No  E= \"Have you ever had a drink as an Eye-opener first thing in the morning to steady your nerves or to help a hangover? \"  No      Deferred []      Reason for deferring: N/A    *If yes to two or more: probable alcohol abuse. Alicia Ravi LPN  46/24/39 [id number]
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
## 2023-03-01 NOTE — GROUP NOTE
Group Therapy Note    Date: 3/1/2023    Group Start Time: 1100  Group End Time: 1353  Group Topic: Cognitive Skills    ROBERTO BHDEBRA Villa        Group Therapy Note    Attendees: 6/18     Topic: Socialization, practice/improve decision making, and communication skills. Goal of Group: To increase social interaction and to practice/improve decision making, and communication skills. Comments:      Patient did not participate in Cognitive Skills Group at 11:00, despite staff encouragement and explanation of benefits. Patient remains seclusive to self in room during group time. Q15 minute safety checks maintained for patient safety and will continue to encourage patient to attend unit programming.             Discipline Responsible: Psychoeducational Specialist        Signature:  Alka Aggarwal

## 2023-03-02 PROCEDURE — 1240000000 HC EMOTIONAL WELLNESS R&B

## 2023-03-02 PROCEDURE — 6370000000 HC RX 637 (ALT 250 FOR IP): Performed by: PSYCHIATRY & NEUROLOGY

## 2023-03-02 RX ADMIN — NICOTINE POLACRILEX 2 MG: 2 GUM, CHEWING BUCCAL at 12:31

## 2023-03-02 RX ADMIN — MIRTAZAPINE 7.5 MG: 15 TABLET, FILM COATED ORAL at 20:49

## 2023-03-02 RX ADMIN — NICOTINE POLACRILEX 2 MG: 2 GUM, CHEWING BUCCAL at 19:26

## 2023-03-02 NOTE — PROGRESS NOTES
BEHAVIORAL HEALTH FOLLOW-UP NOTE     3/2/2023     Patient was seen and examined in person, Chart reviewed   Patient's case discussed with staff/team    Chief Complaint: Depression with suicidal ideation    Interim History:     Giulia Middleton took his Remeron as scheduled last night. He reports that he feels like it helped him sleep very well. He denies side effects related to using the medication. He had emergency medications last night and this morning. He also has used Nicorette gum last night. He was seen today resting on his bed. He reports that his mood is not depressed at all today and that he is only a little anxious because he wants to go home. Throughout the interview, he was incredibly focused on discharge. He said that he feels good and that his need to be here has passed. He says he misses his family and he wants to be with his  baby. He shared that he will be going to his mother's house for now as well, so the stress of bills will be much reduced. He asked six times during the interview if he could be sent home today. He denies suicidal ideation at this time, and he says the last time he felt it was last night before he took the Remeron. He also denies homicidal ideation. He is able to contract for safety both in and out of the unit, and he shared that if he developed any suicidal or homicidal ideations he knows to proceed to the ER. He reported that he slept very well last night, has a decent appetite, and has more energy today than he has in weeks. He only attended one group session yesterday and feels that the groups just are not helpful for him. He plans to continue following with the [facility] center once he leaves here.     BP (!) 117/58   Pulse 51   Temp 97.7 °F (36.5 °C) (Oral)   Resp 14   Ht 5' 5\" (1.651 m)   Wt 134 lb (60.8 kg)   SpO2 100%   BMI 22.30 kg/m²   Appetite: [x] Normal/Unchanged  [] Increased  [] Decreased      Sleep:       [] Normal/Unchanged  [x] Fair       [] Poor Energy:    [] Normal/Unchanged  [x] Increased  [] Decreased        Aggression:  [] yes  [x] no    Patient is [x] able  [] unable to CONTRACT FOR SAFETY ON THE UNIT    PAST MEDICAL/PSYCHIATRIC HISTORY:   Past Medical History:   Diagnosis Date    Anxiety and depression     Attention deficit hyperactivity disorder (ADHD), combined type     History of Chiari malformation        FAMILY/SOCIAL HISTORY:  Family History   Problem Relation Age of Onset    Breast Cancer Mother     Kidney stones Mother     Anxiety Disorder Mother     Anxiety Disorder Brother     Abdominal aortic aneurysm Maternal Grandfather     Prostate Cancer Neg Hx     Colon Cancer Neg Hx      Social History     Socioeconomic History    Marital status: Single     Spouse name: Not on file    Number of children: 0    Years of education: 12    Highest education level: High school graduate   Occupational History    Not on file   Tobacco Use    Smoking status: Never    Smokeless tobacco: Never   Vaping Use    Vaping Use: Every day    Start date: 7/24/2019    Substances: Nicotine    Devices: Pre-filled pod   Substance and Sexual Activity    Alcohol use: No    Drug use: No    Sexual activity: Yes     Partners: Female     Birth control/protection: Condom   Other Topics Concern    Not on file   Social History Narrative    ** Merged History Encounter **          Social Determinants of Health     Financial Resource Strain: Low Risk     Difficulty of Paying Living Expenses: Not hard at all   Food Insecurity: No Food Insecurity    Worried About Running Out of Food in the Last Year: Never true    Ran Out of Food in the Last Year: Never true   Transportation Needs: Unknown    Lack of Transportation (Medical): Not on file    Lack of Transportation (Non-Medical):  No   Physical Activity: Not on file   Stress: Not on file   Social Connections: Not on file   Intimate Partner Violence: Not on file   Housing Stability: Unknown    Unable to Pay for Housing in the Last Year: Not on file    Number of Places Lived in the Last Year: Not on file    Unstable Housing in the Last Year: No     ROS:  [x] All negative/unchanged except if checked.  Explain positive(checked items) below:  [] Constitutional  [] Eyes  [] Ear/Nose/Mouth/Throat  [] Respiratory  [] CV  [] GI  []   [] Musculoskeletal  [] Skin/Breast  [] Neurological  [] Endocrine  [] Heme/Lymph  [] Allergic/Immunologic    Explanation:     MEDICATIONS:    Current Facility-Administered Medications:     acetaminophen (TYLENOL) tablet 650 mg, 650 mg, Oral, Q4H PRN, Kelsey Read MD    aluminum & magnesium hydroxide-simethicone (MAALOX) 200-200-20 MG/5ML suspension 30 mL, 30 mL, Oral, Q6H PRN, Kelsey Read MD    hydrOXYzine HCl (ATARAX) tablet 50 mg, 50 mg, Oral, TID PRN, Kelsey Read MD    ibuprofen (ADVIL;MOTRIN) tablet 400 mg, 400 mg, Oral, Q6H PRN, Kelsey Read MD    nicotine polacrilex (NICORETTE) gum 2 mg, 2 mg, Oral, PRN, Kelsey Read MD, 2 mg at 03/01/23 1726    traZODone (DESYREL) tablet 50 mg, 50 mg, Oral, Nightly PRN, Kelsey Read MD    polyethylene glycol (GLYCOLAX) packet 17 g, 17 g, Oral, Daily PRN, Kelsey Read MD    mirtazapine (REMERON) tablet 7.5 mg, 7.5 mg, Oral, Nightly, Kelsey Read MD, 7.5 mg at 03/01/23 2045      Examination:  BP (!) 117/58   Pulse 51   Temp 97.7 °F (36.5 °C) (Oral)   Resp 14   Ht 5' 5\" (1.651 m)   Wt 134 lb (60.8 kg)   SpO2 100%   BMI 22.30 kg/m²   Gait - steady, fluid, symmetric arm swing  Medication side effects(SE): just increased sleep, denies other side effects    Mental Status Examination:    Level of consciousness:  within normal limits, awake and alert   Appearance: appropriate attire for setting, seated on bed, fair grooming and fair hygiene  Behavior/Motor: no abnormalities noted and much more expressive than yesterday  Attitude toward examiner:  cooperative, attentive, and good eye contact  Speech:  spontaneous, normal rate, normal volume, and well articulated   Mood: denied any depression at this time, feels \"good\", and anxious  Affect:  mood congruent and anxious  Thought processes:  linear, goal directed, and coherent   Thought content:  Homicidal ideation - none  Suicidal Ideation:  denies suicidal ideation  Delusions:  no evidence of delusions  Perceptual Disturbance:  denies any perceptual disturbance  Cognition:  oriented to person, situation, place, and time   Concentration intact  Insight fair   Judgement fair     ASSESSMENT: Depression with suicidal ideation  Patient symptoms are:  [] Well controlled  [x] Improving  [] Worsening  [] No change    Diagnosis: MDD, recurrent and severe without psychotic features  Principal Problem:    Severe recurrent major depression without psychotic features ([facility]  Active Problems:    Depression with suicidal ideation    Other insomnia    Chiari malformation type [facility] ([facility]    Migraine with aura    Chronic headache    ADHD (attention deficit hyperactivity disorder)  Resolved Problems:    * No resolved hospital problems. *      LABS:    Recent Labs     02/28/23  2334   WBC 9.6   HGB 16.5        Recent Labs     02/28/23  2334      K 3.6*      CO2 27   BUN 12   CREATININE 0.83   GLUCOSE 83     Recent Labs     02/28/23  2334   BILITOT 0.6   ALKPHOS 103   AST 18   ALT 16     No results found for: Tivis Deed, BARBSCNU, LABBENZ, CANNAB, COCAINESCRN, LABMETH, OPIATESCREENURINE, PHENCYCLIDINESCREENURINE, PPXUR, ETOH  Lab Results   Component Value Date/Time    TSH 3.00 03/28/2015 08:35 AM     No results found for: LITHIUM  No results found for: VALPROATE, CBMZ    RISK ASSESSMENT: routine per unit protocol    Treatment Plan:  Reviewed current Medications with the patient. Symptoms are improving  Remeron 7.5 mg PO nightly  Monitor need and frequency of PRN medications  Encourage patient to attend group and other milieu activities.   Discharge planning discussed with the patient and treatment team.    PSYCHOTHERAPY/COUNSELING:  [] Therapeutic interview  [x] Supportive  [] CBT  [] Ongoing  [] Other    [] Patient continues to need, on a daily basis, active treatment furnished directly by or requiring the supervision of inpatient psychiatric personnel      Anticipated Length of stay:1-2 days    Paula Conte is a [address] y.o. male being evaluated face to face. --LYNDON Zhang on 3/2/2023 at 11:39 AM    An electronic signature was used to authenticate this note. **This report has been created using voice recognition software. It may contain minor errors which are inherent in voice recognition technology. **

## 2023-03-02 NOTE — PLAN OF CARE
Problem: Self Harm/Suicidality  Goal: Will have no self-injury during hospital stay  Description: INTERVENTIONS:  1. Ensure constant observer at bedside with Q15M safety checks  2. Maintain a safe environment  3. Secure patient belongings  4. Ensure family/visitors adhere to safety recommendations  5. Ensure safety tray has been added to patient's diet order  6. Every shift and PRN: Re-assess suicidal risk via Frequent Screener    3/2/2023 1105 by Uma Pizarro LPN  Outcome: Progressing  Flowsheets (Taken 3/2/2023 1105)  Will have no self-injury during hospital stay: Maintain a safe environment  Note: No violent or negative behaviors noted at this time. Will cont to provide safe, calm, environment and use verbal de-escalation techniques when needed. Support and reassurance given as needed.

## 2023-03-02 NOTE — PROGRESS NOTES
Daily Progress Note  3/2/2023    Patient Name: Laura Plunkett: Depression with suicidal ideation         SUBJECTIVE:      Patient is seen today for a follow up assessment. Nursing staff report that Alina Mas has maintained medication adherence and has not required emergency medications or exhibited acute behavioral changes since his admission yesterday. He is agreeable to assessment the privacy of the [employer] where he is very discharge focus and states \"when cannot get out of here\". He does endorse improved sleep pattern and improved suicidal ideation and seems to have minimal insight into the seriousness of reporting his plans to end his own life. Discussed with him the importance of to stabilize symptoms including good sleep, good appetite and ability to contract for safety in the community. He verbalizes an understanding. He denies side effects related to starting Remeron and feels it was beneficial to his sleep and mood. At this time he agrees he will participate in group programming and is aware we will reconnect him with the [facility] prior to arranging his discharge. He denies having additional questions or concerns at this time.     Appetite:  [x] Adequate/Unchanged  [] Increased  [] Decreased      Sleep:       [x] Adequate/Unchanged  [] Fair  [] Poor      Group Attendance on Unit:   [x] Yes   [] Selectively    [] No    Compliant with scheduled medications: [x] Yes  [] No    Received emergency medications in past 24 hrs: [] Yes   [x] No    Medication Side Effects: Denies         Mental Status Exam  Level of consciousness: Alert and awake   Appearance: Appropriate attire for setting, seated in chair, with fair  grooming and hygiene   Behavior/Motor: Approachable, engages with interviewer, no psychomotor abnormalities   Attitude toward examiner: Cooperative, attentive, good eye contact  Speech: spontaneous, normal rate, and normal volume   Mood: Patient reports \"better\"  Affect: Congruent  Thought processes: linear and coherent   Thought content: Denies homicidal ideation  Suicidal Ideation: Reports improvement in suicidal ideations, contracts for safety on the unit. Delusions: No evidence of delusions  Perceptual Disturbance: Denies auditory or visual hallucinations patient does not appear to be responding to internal stimuli. Cognition: Oriented to self, location, time, and situation  Memory: intact  Insight: poor   Judgement: poor       Data   height is 5' 5\" (1.651 m) and weight is 134 lb (60.8 kg). His oral temperature is 97.7 °F (36.5 °C). His blood pressure is 117/58 (abnormal) and his pulse is 51. His respiration is 14 and oxygen saturation is 100%.    Labs:   Admission on 02/28/2023   Component Date Value Ref Range Status    WBC 02/28/2023 9.6  4.5 - 13.5 k/uL Final    RBC 02/28/2023 5.78  4.5 - 5.9 m/uL Final    Hemoglobin 02/28/2023 16.5  13.5 - 17.5 g/dL Final    Hematocrit 02/28/2023 49.1  41 - 53 % Final    MCV 02/28/2023 84.8  80 - 100 fL Final    MCH 02/28/2023 28.6  26 - 34 pg Final    MCHC 02/28/2023 33.7  31 - 37 g/dL Final    RDW 02/28/2023 14.2  11.5 - 14.9 % Final    Platelets 24/37/4933 264  150 - 450 k/uL Final    MPV 02/28/2023 7.1  6.0 - 12.0 fL Final    Seg Neutrophils 02/28/2023 64  34 - 64 % Final    Lymphocytes 02/28/2023 27  25 - 45 % Final    Monocytes 02/28/2023 7  2 - 8 % Final    Eosinophils % 02/28/2023 1  0 - 4 % Final    Basophils 02/28/2023 1  0 - 2 % Final    Segs Absolute 02/28/2023 6.20  1.3 - 9.1 k/uL Final    Absolute Lymph # 02/28/2023 2.60  1.2 - 5.2 k/uL Final    Absolute Mono # 02/28/2023 0.70  0.1 - 1.3 k/uL Final    Absolute Eos # 02/28/2023 0.10  0.0 - 0.4 k/uL Final    Basophils Absolute 02/28/2023 0.10  0.0 - 0.2 k/uL Final    Glucose 02/28/2023 83  70 - 99 mg/dL Final    BUN 02/28/2023 12  6 - 20 mg/dL Final    Creatinine 02/28/2023 0.83  0.70 - 1.20 mg/dL Final    Est, Glom Filt Rate 02/28/2023 >60  >60 mL/min/1.73m2 Final Comment:       These results are not intended for use in patients <25years of age. eGFR results are calculated without a race factor using the 2021 CKD-EPI equation. Careful clinical correlation is recommended, particularly when comparing to results   calculated using previous equations. The CKD-EPI equation is less accurate in patients with extremes of muscle mass, extra-renal   metabolism of creatine, excessive creatine ingestion, or following therapy that affects   renal tubular secretion. Calcium 02/28/2023 10.1  8.6 - 10.4 mg/dL Final    Sodium 02/28/2023 138  135 - 144 mmol/L Final    Potassium 02/28/2023 3.6 (A)  3.7 - 5.3 mmol/L Final    Chloride 02/28/2023 100  98 - 107 mmol/L Final    CO2 02/28/2023 27  20 - 31 mmol/L Final    Anion Gap 02/28/2023 11  9 - 17 mmol/L Final    Alkaline Phosphatase 02/28/2023 103  40 - 129 U/L Final    ALT 02/28/2023 16  5 - 41 U/L Final    AST 02/28/2023 18  <40 U/L Final    Total Bilirubin 02/28/2023 0.6  0.3 - 1.2 mg/dL Final    Total Protein 02/28/2023 7.8  6.4 - 8.3 g/dL Final    Albumin 02/28/2023 5.2  3.5 - 5.2 g/dL Final    Ethanol 02/28/2023 <10  <10 mg/dL Final    Ethanol percent 02/28/2023 <0.010  % Final    Acetaminophen Level 02/28/2023 <5 (A)  10 - 30 ug/mL Final    Salicylate Lvl 09/05/6758 <1 (A)  3 - 10 mg/dL Final         Reviewed patient's current plan of care and vital signs with nursing staff.     Labs reviewed: [x] Yes  No available EKG  Medications  Current Facility-Administered Medications: acetaminophen (TYLENOL) tablet 650 mg, 650 mg, Oral, Q4H PRN  aluminum & magnesium hydroxide-simethicone (MAALOX) 200-200-20 MG/5ML suspension 30 mL, 30 mL, Oral, Q6H PRN  hydrOXYzine HCl (ATARAX) tablet 50 mg, 50 mg, Oral, TID PRN  ibuprofen (ADVIL;MOTRIN) tablet 400 mg, 400 mg, Oral, Q6H PRN  nicotine polacrilex (NICORETTE) gum 2 mg, 2 mg, Oral, PRN  traZODone (DESYREL) tablet 50 mg, 50 mg, Oral, Nightly PRN  polyethylene glycol (GLYCOLAX) packet 17 g, 17 g, Oral, Daily PRN  mirtazapine (REMERON) tablet 7.5 mg, 7.5 mg, Oral, Nightly    ASSESSMENT  Severe recurrent major depression without psychotic features ([facility] [address]       1  PLAN  Patient symptoms are: Showing improvement  No Medication Changes Today  Monitor need and frequency of PRN medications. Encourage participation in groups and milieu. Attempt to develop insight. Psycho-education conducted. Probable discharge is currently undetermined  Follow-up daily while inpatient. Patient continues to be monitored in the inpatient psychiatric facility at [facility] for safety and stabilization. Patient continues to need, on a daily basis, active treatment furnished directly by or requiring the supervision of inpatient psychiatric personnel. Electronically signed by HEIDE Capps CNP on 3/2/2023 at 3:03 PM    **This report has been created using voice recognition software. It may contain minor errors which are inherent in voice recognition technology. **

## 2023-03-02 NOTE — H&P
[facility] Internal Medicine  Oscar Hobson MD; Meredith Narayanan MD; Sung Hutchinson MD; MD Barry Baxter MD; Eli Brittle, MD [facility] Internal Medicine   [address] / HISTORY AND PHYSICAL EXAMINATION            Date:   3/1/2023  Patient name:  Janice Duffy  Date of admission:  2/28/2023 11:18 PM  MRN:   [id number]  Account:  [de-identified]  YOB: 2000  PCP:    HEIDE Short CNP  Room:   [address] [place] [address]  Code Status:    Full Code    Physician Requesting Consult: Sherrie Gaines MD    Reason for Consult:  H&P    Chief Complaint:     Chief Complaint   Patient presents with    Psychiatric Evaluation       History Obtained From:     patient    History of Present Illness:     59-year-old gentleman with underlying history of anxiety and depression with ADHD, current smoker, advised to quit smoking, admitted inpatient psych for suicidal ideations      Past Medical History:     Past Medical History:   Diagnosis Date    Anxiety and depression     Attention deficit hyperactivity disorder (ADHD), combined type     History of Chiari malformation         Past Surgical History:     History reviewed. No pertinent surgical history. Medications Prior to Admission:     Prior to Admission medications    Medication Sig Start Date End Date Taking? Authorizing Provider   ibuprofen (ADVIL;MOTRIN) 600 MG tablet Take 600 mg by mouth every 6 hours as needed for Pain   Yes Historical Provider, MD   melatonin 3 MG TABS tablet 1 tablet DAILY for 30 days 10/6/09   Historical Provider, MD   acetaminophen (TYLENOL) 500 MG tablet Take 2 tablets by mouth every 8 hours as needed for Pain  Patient taking differently: Take 1,000 mg by mouth every 6 hours as needed for Pain 3/10/22 1/20/23  Vernel Pulse, DO        Allergies:     Patient has no known allergies. Social History:     Tobacco:    reports that he has never smoked. He has never used smokeless tobacco.  Alcohol:      reports no history of alcohol use. Drug Use:  reports no history of drug use. Family History:     Family History   Problem Relation Age of Onset    Breast Cancer Mother     Kidney stones Mother     Anxiety Disorder Mother     Anxiety Disorder Brother     Abdominal aortic aneurysm Maternal Grandfather     Prostate Cancer Neg Hx     Colon Cancer Neg Hx        Review of Systems:     Positive and Negative as described in HPI. Physical Exam:     BP (!) 113/52   Pulse 62   Temp 98.2 °F (36.8 °C) (Oral)   Resp 14   Ht 5' 5\" (1.651 m)   Wt 134 lb (60.8 kg)   SpO2 100%   BMI 22.30 kg/m²   Temp (24hrs), Av.2 °F (36.8 °C), Min:98 °F (36.7 °C), Max:98.6 °F (37 °C)    No results for input(s): POCGLU in the last 72 hours. No intake or output data in the 24 hours ending 23    General Appearance:  alert, well appearing, and in no acute distress  Mental status: oriented to person, place, and time with normal affect  Lungs: Bilateral equal air entry, clear to ausculation, no wheezing, rales or rhonchi, normal effort  Cardiovascular: normal rate, regular rhythm, no murmur, gallop, rub.   Abdomen: Soft, nontender, nondistended, normal bowel sounds, no hepatomegaly or splenomegaly  Neurologic: There are no new focal motor or sensory deficits, normal muscle tone and bulk, no abnormal sensation, normal speech, cranial nerves II through XII grossly intact  Skin: No gross lesions, rashes, bruising or bleeding on exposed skin area  Extremities:  peripheral pulses palpable, no pedal edema or calf pain with palpation    Investigations:      Laboratory Testing:  Recent Results (from the past 24 hour(s))   CBC with Auto Differential    Collection Time: 23 11:34 PM   Result Value Ref Range    WBC 9.6 4.5 - 13.5 k/uL    RBC 5.78 4.5 - 5.9 m/uL    Hemoglobin 16.5 13.5 - 17.5 g/dL    Hematocrit 49.1 41 - 53 %    MCV 84.8 80 - 100 fL    MCH 28.6 26 - 34 pg MCHC 33.7 31 - 37 g/dL    RDW 14.2 11.5 - 14.9 %    Platelets 902 130 - 476 k/uL    MPV 7.1 6.0 - 12.0 fL    Seg Neutrophils 64 34 - 64 %    Lymphocytes 27 25 - 45 %    Monocytes 7 2 - 8 %    Eosinophils % 1 0 - 4 %    Basophils 1 0 - 2 %    Segs Absolute 6.20 1.3 - 9.1 k/uL    Absolute Lymph # 2.60 1.2 - 5.2 k/uL    Absolute Mono # 0.70 0.1 - 1.3 k/uL    Absolute Eos # 0.10 0.0 - 0.4 k/uL    Basophils Absolute 0.10 0.0 - 0.2 k/uL   Comprehensive Metabolic Panel    Collection Time: 02/28/23 11:34 PM   Result Value Ref Range    Glucose 83 70 - 99 mg/dL    BUN 12 6 - 20 mg/dL    Creatinine 0.83 0.70 - 1.20 mg/dL    Est, Glom Filt Rate >60 >60 mL/min/1.73m2    Calcium 10.1 8.6 - 10.4 mg/dL    Sodium 138 135 - 144 mmol/L    Potassium 3.6 (L) 3.7 - 5.3 mmol/L    Chloride 100 98 - 107 mmol/L    CO2 27 20 - 31 mmol/L    Anion Gap 11 9 - 17 mmol/L    Alkaline Phosphatase 103 40 - 129 U/L    ALT 16 5 - 41 U/L    AST 18 <40 U/L    Total Bilirubin 0.6 0.3 - 1.2 mg/dL    Total Protein 7.8 6.4 - 8.3 g/dL    Albumin 5.2 3.5 - 5.2 g/dL   Ethanol    Collection Time: 02/28/23 11:34 PM   Result Value Ref Range    Ethanol <10 <10 mg/dL    Ethanol percent <0.010 %   Acetaminophen level    Collection Time: 02/28/23 11:34 PM   Result Value Ref Range    Acetaminophen Level <5 (L) 10 - 30 ug/mL   Salicylate    Collection Time: 02/28/23 11:34 PM   Result Value Ref Range    Salicylate Lvl <1 (L) 3 - 10 mg/dL       Imaging/Diagonstics:  No results found.     Assessment :      Hospital Problems             Last Modified POA    * (Principal) Severe recurrent major depression without psychotic features ([facility] [address] 3/1/2023 Yes    Depression with suicidal ideation 3/1/2023 Yes    Other insomnia 3/1/2023 Yes    Chiari malformation type I ([address] 3/1/2023 Yes    Migraine with aura 3/1/2023 Yes    Chronic headache 3/1/2023 Yes    ADHD (attention deficit hyperactivity disorder) 3/1/2023 Yes       Plan:     27-year-old gentleman with underlying history of anxiety and depression, admitted to inpatient psych for suicidal ideations,  Insomnia, continue medications,  Chronic headache, continue ibuprofen,  Current smoker, advised to quit smoking,  History medication per psych  DVT prophylaxis, patient mobile,  Full CODE STATUS    Consultations:   Uzma Feldman MD  3/1/2023  9:18 PM    Copy sent to Dr. Patito Hoang, APRN - CNP    Please note that this chart was generated using voice recognition Dragon dictation software. Although every effort was made to ensure the accuracy of this automated transcription, some errors in transcription may have occurred.

## 2023-03-02 NOTE — GROUP NOTE
Group Therapy Note    Date: 3/2/2023    Group Start Time: 1330  Group End Time: 8964  Group Topic: Relaxation    ROBERTO Martin June Bailey Rain LPN    Patient refused to attend [phone] wellness group due to resting in bed, refused 1;1 talk time      Signature:  Evie Najera LPN

## 2023-03-02 NOTE — GROUP NOTE
Group Therapy Note    Date: 3/2/2023    Group Start Time: 1430  Group End Time: 0369  Group Topic: Cognitive Skills    [facility] D    DEBRA Watson        Group Therapy Note    Attendees: 6/22     Patient's Goal:  To increase social interaction and to practice expressing feelings, and explore/practice positive coping skills/factors r/t stress management. Notes: Pt participated fully in group. Pt was able to practice expressing feelings, and explore/practice positive coping skills/factors r/t stress management using creative expression and discussion. Pt shared individually and engaged in group discussion with RT and peers. Pt was supportive of peers and able to relate to some of their ideas and experiences. Status After Intervention:  Improved         Participation Level: Active Listener ,sharing ,supportive     Participation Quality:  Attentive , sharing ,supportive     Speech:  Normal     Thought Process/Content: Logical ,linear. Pt expressed feelings well. Affective Functioning: Congruent     Mood: Euthymic     Level of consciousness:  Alert, and Attentive     Response to Learning: Able to express current knowledge/experience , Able to acknowledge/verbalize new learning,able to verbalize insight, and Progressing to goal        Endings: None Reported     Modes of Intervention: Education, Support, Socialization, Exploration, Clarifying and Problem-solving.         Discipline Responsible: Psychoeducational Specialist     Sarahi Last

## 2023-03-02 NOTE — GROUP NOTE
Group Therapy Note    Date: 3/2/2023    Group Start Time: 1100  Group End Time: 0056  Group Topic: Cognitive Skills    ROBERTO Foster Person, CTRS        Group Therapy Note    Attendees: 7/21     Patient's Goal : To increase social interaction and to practice/improve problem solving, and communication skills. Notes: Pt participated fully in task. Pt was able to practice/improve problem solving, and communication skills independently. Pt was pleasant and cooperative. Status After Intervention: Improved         Participation Level: Active Listener ,sharing ,supportive        Participation Quality:  Attentive , sharing ,supportive     Speech:  Normal        Thought Process/Content: Logical ,linear to task . Affective Functioning: Blunted        Mood: Euthymic        Level of consciousness:  Alert, attentive     Response to Learning: Able to express current knowledge/experience, able to verbalize/acknowledge new learning , and Progressing to goal        Endings: None Reported     Modes of Intervention: Education, Support, Socialization, Exploration, Clarifying and Problem-solving.         Discipline Responsible: Psychoeducational Specialist      Signature:  Domonique Smith

## 2023-03-02 NOTE — GROUP NOTE
Group Therapy Note    Date: 3/2/2023    Group Start Time: 0900  Group End Time: 0920  Group Topic: Community Meeting    ROBERTO Collins LPN        patient refused to attend Goals Group at 0900 after encouragement from staff.   1:1 talk time provided as alternative to group session        Signature:  Jasmin Collins LPN

## 2023-03-02 NOTE — PLAN OF CARE
Problem: Self Harm/Suicidality  Goal: Will have no self-injury during hospital stay  Description: INTERVENTIONS:  1. Ensure constant observer at bedside with Q15M safety checks  2. Maintain a safe environment  3. Secure patient belongings  4. Ensure family/visitors adhere to safety recommendations  5. Ensure safety tray has been added to patient's diet order  6. Every shift and PRN: Re-assess suicidal risk via Frequent Screener    3/2/2023 0110 by Prateek Guerrero RN  Outcome: Progressing  Patient has made no attempt to harm self at this time. Problem: Depression  Goal: Will be euthymic at discharge  Description: INTERVENTIONS:  1. Administer medication as ordered  2. Provide emotional support via 1:1 interaction with staff  3. Encourage involvement in milieu/groups/activities  4. Monitor for social isolation  3/2/2023 0110 by Prateek Guerrero RN  Outcome: Progressing  Patient reports feeling ready for discharge. He is social with peers. He denies depression but reports some anxiety, mostly because his daughter was born yesterday and he is here. Patient denies suicidal ideation, homicidal ideation and hallucinations at this time. Safety plan reviewed with patient, agrees to approach staff when feeling upset. 15 minute and random checks maintained for safety. No violent or escalating behaviors noted during this shift. Patient is currently calm, controlled and medication-compliant.

## 2023-03-03 VITALS
HEART RATE: 59 BPM | TEMPERATURE: 97.7 F | RESPIRATION RATE: 14 BRPM | BODY MASS INDEX: 22.33 KG/M2 | DIASTOLIC BLOOD PRESSURE: 69 MMHG | WEIGHT: 134 LBS | SYSTOLIC BLOOD PRESSURE: 95 MMHG | OXYGEN SATURATION: 100 % | HEIGHT: 65 IN

## 2023-03-03 PROCEDURE — 99238 HOSP IP/OBS DSCHRG MGMT 30/<: CPT | Performed by: PSYCHIATRY & NEUROLOGY

## 2023-03-03 PROCEDURE — 6370000000 HC RX 637 (ALT 250 FOR IP): Performed by: PSYCHIATRY & NEUROLOGY

## 2023-03-03 RX ORDER — MIRTAZAPINE 7.5 MG/1
7.5 TABLET, FILM COATED ORAL NIGHTLY
Qty: 30 TABLET | Refills: 3 | Status: SHIPPED | OUTPATIENT
Start: 2023-03-03

## 2023-03-03 RX ADMIN — NICOTINE POLACRILEX 2 MG: 2 GUM, CHEWING BUCCAL at 14:30

## 2023-03-03 NOTE — GROUP NOTE
Group Therapy Note    Date: 3/3/2023    Group Start Time: 1100  Group End Time: 3915  Group Topic: Cognitive Skills    DEBRA Middleton        Group Therapy Note    Attendees: 7/23     Patient's Goal : To increase social interaction and to practice/improve deductive reasoning, and communication skills. Notes: Pt participated fully in task. Pt was able to practice/improve deductive reasoning, and   communication skills independently. Pt was pleasant and cooperative. Status After Intervention: Improved         Participation Level: Active Listener ,sharing ,supportive        Participation Quality:  Attentive , sharing ,supportive     Speech:  Normal        Thought Process/Content: Logical ,linear to task . Affective Functioning: Congruent        Mood: Euthymic. Pt is hopeful for discharge today. Level of consciousness:  Alert, attentive     Response to Learning: Able to express current knowledge/experience, able to verbalize/acknowledge new learning , and Progressing to goal        Endings: None Reported     Modes of Intervention: Education, Support, Socialization, Exploration, Clarifying and Problem-solving.         Discipline Responsible: Psychoeducational Specialist      Signature:  Parviz Farr

## 2023-03-03 NOTE — PLAN OF CARE
Problem: Self Harm/Suicidality  Goal: Will have no self-injury during hospital stay  Description: INTERVENTIONS:  1. Ensure constant observer at bedside with Q15M safety checks  2. Maintain a safe environment  3. Secure patient belongings  4. Ensure family/visitors adhere to safety recommendations  5. Ensure safety tray has been added to patient's diet order  6. Every shift and PRN: Re-assess suicidal risk via Frequent Screener    3/2/2023 2011 by Risa Paiz RN  Outcome: Progressing  Patient has made no attempt to harm self at this time. Problem: Depression  Goal: Will be euthymic at discharge  Description: INTERVENTIONS:  1. Administer medication as ordered  2. Provide emotional support via 1:1 interaction with staff  3. Encourage involvement in milieu/groups/activities  4. Monitor for social isolation  Outcome: Progressing  Patient denies depression but reports some lingering anxiety related to wanting to be home. He states provider wanted him to stay one more day and patient is accepting. He is social with peers, noted to have a waking group. Patient denies suicidal ideation, homicidal ideation and hallucinations at this time. Safety plan reviewed with patient, agrees to approach staff when feeling upset. 15 minute and random checks maintained for safety. No violent or escalating behaviors noted during this shift. Patient is currently calm, controlled and medication-compliant.

## 2023-03-03 NOTE — BH NOTE
Patient given tobacco quitline number [phone] at this time, refusing to call at this time, states \" I just dont want to quit now\"- patient given information as to the dangers of long term tobacco use. Continue to reinforce the importance of tobacco cessation.
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
## 2023-03-03 NOTE — GROUP NOTE
Group Therapy Note    Date: 3/3/2023    Group Start Time: 0900  Group End Time: 0915  Group Topic: Community Meeting    ROBERTO Rodriguez        Group Therapy Note    Attendees: 4/23       Patient's Goal:  To talk to the doctor about discharge plan. Notes:  Goals Group    Status After Intervention:  Unchanged    Participation Level:  Active Listener and Interactive    Participation Quality: Appropriate, Attentive, Sharing, and Supportive      Speech:  normal      Thought Process/Content: Logical  Linear      Affective Functioning: Congruent      Mood: euthymic      Level of consciousness:  Alert and Oriented x4      Response to Learning: Progressing to goal      Endings: None Reported    Modes of Intervention: Support, Socialization, and Exploration      Discipline Responsible: Behavorial Health Tech      Signature:  Celestina Rodriguez
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
## 2023-03-03 NOTE — DISCHARGE INSTRUCTIONS
Information:  Medications:   Medication summary provided   I understand that I should take only the medications on my list.     -why and when I need to take each medicine.     -which side effects to watch for.     -that I should carry my medication information at all times in case of     Emergency situations. I will take all of my medicines to follow up appointments.     -check with my physician or pharmacist before taking any new    Medication, over the counter product or drink alcohol.    -Ask about food, drug or dietary supplement interactions.    -discard old lists and update records with medication providers. Notify Physician:  Notify physician if you notice:   Always call 911 if you feel your life is in danger  In case of an emergency call 911 immediately! If 911 is not available call your local emergency medical system for help    Behavioral Health Follow Up:  Original Referral Source:[facility]  Discharge Diagnosis: Depression with suicidal ideation [F32. A, R45.851]  Recommendations for Level of Care: take medications as ordered, keep all follow up appt  Patient status at discharge: alert/oriented, medication compliant  My hospital  was: [facility]  Aftercare plan faxed: zepf   -faxed by: staff   -date: 3/3/23   -time: 1423  Prescriptions: sent to [facility]    Smoking: Quit Smoking. Call the [employer]'s smoking quitline at 3-011-91B-QUIT  Know the signs of a heart attack   If you have any of the following symptoms call 911 immediately, do not wait more    Than five minutes. 1. Pressure, fullness and/ or squeezing in the center of the chest spreading to    The jaw, neck or shoulder. 2. Chest discomfort with light headedness, fainting, sweating, nausea or    Shortness of breath. 3. Upper abdominal pressure or discomfort. 4. Lower chest pain, back pain, unusual fatigue, shortness of breath, nausea   Or dizziness.      General Information:   Questions regarding your bill: Call HELP program [phone]     Suicide Hotline (Mechelle Foley)  (131) 831-7523      Recovery Help line- 944.419.3330      To obtain results of pending studies call Medical Records at: [phone]     For emergencies and 24 hour/7 days a week contact information:  [phone]        Learning About Coronavirus (COVID-19)  What is coronavirus (COVID-19)? COVID-19 is a disease caused by a type of coronavirus. This illness was first found in December 2019. It has since spread worldwide. Coronaviruses are a large group of viruses. They cause the common cold. They also cause more serious illnesses like Middle East respiratory syndrome (MERS) and severe acute respiratory syndrome (SARS). COVID-19 is caused by a novel coronavirus. That means it's a new type that has not been seen in people before. What are the symptoms? COVID-19 symptoms may include:  Fever. Cough. Trouble breathing. Chills or repeated shaking with chills. Muscle and body aches. Headache. Sore throat. New loss of taste or smell. Vomiting. Diarrhea. In severe cases, COVID-19 can cause pneumonia and make it hard to breathe without help from a machine. It can cause death. How is it diagnosed? COVID-19 is diagnosed with a viral test. This may also be called a PCR test or antigen test. It looks for evidence of the virus in your breathing passages or lungs (respiratory system). The test is most often done on a sample from the nose, throat, or lungs. It's sometimes done on a sample of saliva. One way a sample is collected is by putting a long swab into the back of your nose. If you have questions about COVID-19 testing, ask your doctor or go to cdc.gov to use the COVID-19 Viral Testing Tool. How is it treated? Mild cases of COVID-19 can be treated at home. Serious cases need treatment in the hospital. Treatment may include medicines, plus breathing support such as oxygen therapy or a ventilator.  Some people may be placed on their belly to help their oxygen levels. Treatments that may help people who have COVID-19 include:  Antiviral medicines. These medicines treat viral infections. Immune-based therapy. These medicines help the immune system fight COVID-19. Examples include monoclonal antibodies. Blood thinners. These medicines help prevent blood clots. People with severe illness are at risk for blood clots. How can you protect yourself and others? How can you protect yourself and others from COVID-19? Stay up to date on your COVID-19 vaccines. Avoid sick people, and stay away from others if you are sick. Keep some physical distance between yourself and other people. Avoid crowds, especially indoors. Wear a mask with the best fit, protection, and comfort for you. A mask can help protect you even when others aren't wearing one. Get tested for COVID-19 before you have an indoor visit with people who don't live with you. Improve airflow. If you have to spend time indoors with others, open windows and doors. Or you can use a fan to blow air away from people and out a window. Choose outdoor visits and activities when possible. Cover your mouth with a tissue when you cough or sneeze. Wash your hands often. Avoid touching your mouth, nose, and eyes. Here are some other steps you may need to take. If you were exposed to someone with COVID-19 AND you don't have symptoms:  For at least 10 full days, wear a high-quality mask when you are around other people, even those you live with. Get tested. Do it right away if you develop symptoms. Wait at least 5 days after you were exposed if you don't have symptoms. If your test is positive, call your doctor right away. The doctor may have you take a medicine to keep you from getting seriously ill. Treatment works best when started early. And isolate right away. Take extra care if you have to be around other people who are at high risk of getting seriously ill from COVID-19.  Keep some extra space between yourself and others, for example. And don't travel. Watch for symptoms. If you are sick or test positive for COVID-19:   Talk to your doctor as soon as you can. Your doctor may have you take medicine to help prevent serious illness. Get a COVID-19 test unless you have already been tested. You may need to be tested more than once. Stay home and separate yourself from others, including those you live with. Limit contact with people in your home. If possible, stay in a separate bedroom and use a separate bathroom. For at least 10 full days, anytime you're around other people, you and they should wear a high-quality mask. Children younger than 3years old don't need to wear a mask. Self-isolate until it's safe to be around others again. (Important: Day 0 is the day your symptoms started or the day you tested positive. Day 1 is the day after your symptoms first started or your test was positive.)  If you tested positive but had no symptoms, it's safe to end isolation at the end of Day 5. But if you start to have symptoms, follow the recommendations below and count your first day of symptoms as Day 0. If you have symptoms, when you can end isolation depends on how sick you were and your overall health. No matter what, you need to wait until your symptoms are getting better and you haven't had a fever for 24 hours while not taking medicines to lower the fever. Here's how long to isolate, based on your symptoms:  If you were only a little sick: (This means you might have felt really bad but had no shortness of breath and never needed to be in the hospital.) You can end isolation at the end of Day 5. If you were more sick: (You had some shortness of breath or some trouble breathing but never needed to be in the hospital.) You can end isolation at the end of Day 10. If you were very sick and needed to be in the hospital, or if you have a weakened immune system:  You can end isolation at the end of Day 10 or later. Talk to your doctor to find out when it's safe to end isolation. You may need a viral test.  After you end isolation, if your symptoms come back or get worse: Restart your isolation at Day 0. Do this even if it happens after you took medicine for COVID. Avoid travel and stay away from people at high risk for serious disease for at least 10 days. Check the CDC website at cdc.gov for the most current information on how to protect yourself. How can you self-isolate when you have COVID-19? If you have COVID-19, there are things you can do to help avoid spreading the virus to others. Stay home, and avoid contact with other people. Limit contact with people in your home. If possible, stay in a separate bedroom and use a separate bathroom. Wear a high-quality mask when you are around other people. Improve airflow. If you have to spend time indoors with others, open windows and doors. Or you can use a fan to blow air away from people and out a window. Avoid contact with pets and other animals. Cover your mouth and nose with a tissue when you cough or sneeze. Then throw it in the trash right away. Wash your hands often, especially after you cough or sneeze. Use soap and water, and scrub for at least 20 seconds. If soap and water aren't available, use an alcohol-based hand . Don't share personal household items. These include bedding, towels, cups and glasses, and eating utensils. [address] in the warmest water allowed for the fabric type, and dry it completely. It's okay to wash other people's laundry with yours. Clean and disinfect your home. Use household  and disinfectant wipes or sprays. Go to the [facility] website at cdc.gov if you have questions. When should you call for help? Call 911 anytime you think you may need emergency care. For example, call if you have life-threatening symptoms, such as: You have severe trouble breathing.  (You can't talk at all.)     You have constant chest pain or pressure. You are severely dizzy or lightheaded. You are confused or can't think clearly. You have pale, gray, or blue-colored skin or lips. You pass out (lose consciousness) or are very hard to wake up. You have loss of balance or trouble walking. You have trouble seeing out of one or both eyes. You have weakness or drooping on one side of the face. You have weakness or numbness in an arm or a leg. You have trouble speaking. You have a severe headache. You have a seizure. Call your doctor now or seek immediate medical care if:    You have moderate trouble breathing. (You can't speak a full sentence.)     You are coughing up blood. You have signs of low blood pressure. These include feeling lightheaded; being too weak to stand; and having cold, pale, clammy skin. Watch closely for changes in your health, and be sure to contact your doctor if:    Your symptoms get worse. You are not getting better as expected. You have new or worse symptoms of anxiety, depression, nightmares, or flashbacks. Call before you go to the doctor's office. Follow their instructions. And wear a mask. Where can you learn more? Go to http://www.woods.com/ and enter C008 to learn more about \"Learning About Coronavirus (COVID-19). \"  Current as of: October 28, 2022               Content Version: 13.5  © 3691-3603 Healthwise, Incorporated. Care instructions adapted under license by [facility] ([facility]). If you have questions about a medical condition or this instruction, always ask your healthcare professional. [place] [address] any warranty or liability for your use of this information.

## 2023-03-03 NOTE — GROUP NOTE
Group Therapy Note    Date: 3/3/2023    Group Start Time: 1430  Group End Time: 7356  Group Topic: Cognitive Skills    [facility] D    DEBRA Eaton        Group Therapy Note    Attendees: 8/20     Patient's Goal : To increase social interaction and to practice/improve decision making, and communication skills. Notes: Pt participated fully in task. Pt was able to practice/improve decision making, and   communication skills independently. Pt was pleasant and cooperative. Status After Intervention: Improved         Participation Level: Active Listener ,sharing ,supportive        Participation Quality:  Attentive , sharing ,supportive     Speech:  Normal        Thought Process/Content: Logical ,linear to task . Affective Functioning: Congruent        Mood: Euthymic        Level of consciousness:  Alert, attentive     Response to Learning: Able to express current knowledge/experience, able to verbalize/acknowledge new learning , and Progressing to goal        Endings: None Reported     Modes of Intervention: Education, Support, Socialization, Exploration, Clarifying and Problem-solving.         Discipline Responsible: Psychoeducational Specialist      Signature:  Jorge Quispe

## 2023-03-03 NOTE — BH NOTE
[facility]  Discharge Note     Pt belongings: Retrieved from room/safe, reviewed and packed to take with. Dental Appliances: None  Vision - Corrective Lenses: Eyeglasses  Hearing Aid: None  Jewelry: None  Body Piercings Removed: N/A  Clothing: Footwear, Hat, Socks, Undergarments, [place], [place], Pants  Other Valuables: Money, Wallet (45.42)       Patient discharged to private residence, picked up by mom. Instructed on discharge instructions, pt verbalizes understanding and signs AVS. Pt in control at time of discharge. Pt ambulates to [facility] main entrance with psych staff. Rx filled and sent with patient. No complaints voiced at this time. Status EXAM upon discharge:  Mental Status and Behavioral Exam  Normal: No  Level of Assistance: Independent/Self  Facial Expression: Brightened  Affect: Appropriate  Level of Consciousness: Alert  Frequency of Checks: 4 times per hour, close  Mood:Normal: No  Mood: Anxious  Motor Activity:Normal: Yes  Motor Activity: Decreased  Eye Contact: Good  Observed Behavior: Preoccupied, Cooperative, Friendly  Sexual Misconduct History: Current - no  Preception: [place] to time, [place] to place, [place] to person, [place] to situation  Attention:Normal: Yes  Attention: Distractible  Thought Processes: Circumstantial  Thought Content:Normal: No  Thought Content: Preoccupations  Depression Symptoms: No problems reported or observed. Anxiety Symptoms: Generalized  [place] Symptoms: No problems reported or observed.   Hallucinations: None  Delusions: No  Memory:Normal: Yes  Memory: Poor recent  Insight and Judgment: No  Insight and Judgment: Poor insight    Sanna Delong LPN

## 2023-03-03 NOTE — PLAN OF CARE
Problem: Self Harm/Suicidality  Goal: Will have no self-injury during hospital stay  Description: INTERVENTIONS:  1. Ensure constant observer at bedside with Q15M safety checks  2. Maintain a safe environment  3. Secure patient belongings  4. Ensure family/visitors adhere to safety recommendations  5. Ensure safety tray has been added to patient's diet order  6. Every shift and PRN: Re-assess suicidal risk via Frequent Screener    3/3/2023 0749 by Avery Resendiz LPN  Outcome: Progressing  Flowsheets (Taken 3/3/2023 0749)  Will have no self-injury during hospital stay: Maintain a safe environment  Note: No violent or negative behaviors noted at this time. Patient focused on discharge. Instructed on participating in programming, and treatment planning. Will cont to provide safe, calm, environment and use verbal de-escalation techniques when needed. Support and reassurance given as needed.

## 2023-03-06 NOTE — CARE COORDINATION
Name: Kamaljit Perez    : 2000    Discharge Date: 3/3/23    Primary Auth/Cert #:[id number]     Destination: home     Discharge Medications:      Medication List        START taking these medications      mirtazapine 7.5 MG tablet  Commonly known as: REMERON  Take 1 tablet by mouth nightly  Notes to patient: Sleep/mood            CHANGE how you take these medications      acetaminophen 500 MG tablet  Commonly known as: TYLENOL  Take 2 tablets by mouth every 8 hours as needed for Pain  What changed: when to take this  Notes to patient: Pain             CONTINUE taking these medications      ibuprofen 600 MG tablet  Commonly known as: ADVIL;MOTRIN  Notes to patient: Pain             STOP taking these medications      citalopram 10 MG tablet  Commonly known as: CELEXA     melatonin 3 MG Tabs tablet               Where to Get Your Medications        These medications were sent to [address] [place] [address] [phone]  [address] [place] [address],  Milla Myers [address]      Phone: [phone]   mirtazapine 7.5 MG tablet         Follow Up Appointment: [address]  [phone]    Follow up on 3/16/2023  You have an appointment at 10:00am    [address]  [phone]    Go on 3/6/2023  You have an appointment at the [facility] at 9:45am

## 2023-03-14 ENCOUNTER — OFFICE VISIT (OUTPATIENT)
Dept: PRIMARY CARE CLINIC | Age: 23
End: 2023-03-14
Payer: COMMERCIAL

## 2023-03-14 VITALS
BODY MASS INDEX: 23.7 KG/M2 | DIASTOLIC BLOOD PRESSURE: 71 MMHG | HEART RATE: 73 BPM | WEIGHT: 142.4 LBS | SYSTOLIC BLOOD PRESSURE: 116 MMHG | OXYGEN SATURATION: 99 %

## 2023-03-14 DIAGNOSIS — F41.8 ANXIETY WITH DEPRESSION: Primary | ICD-10-CM

## 2023-03-14 PROCEDURE — 99213 OFFICE O/P EST LOW 20 MIN: CPT | Performed by: NURSE PRACTITIONER

## 2023-03-14 NOTE — PROGRESS NOTES
[facility] PRIMARY [facility]  [address] [address]  Dept: [phone]  Dept Fax: [phone]    Nitin King (:  2000) is a 23 y.o. male,Established patient, here for evaluation of the following chief complaint(s): Anxiety (4 week follow up) and Depression (4 week follow up)         ASSESSMENT/PLAN:  1. Anxiety with depression    Currently following with counseling services only at [address] [place] [address], does not wish to see psychiatry at this time. Agreed to continue with mirtazapine and follow-up in 3 months. Discussed increasing dosing, however patient wishes to continue with current dosing at this time. Patient was reassured that medication is known to stimulate appetite. No follow-ups on file. Subjective   SUBJECTIVE/OBJECTIVE:  Pt doing well since discharge, taking Mirtazapine daily  Sleeping well, no SI or HI. Does feel groggy some mornings, feels he is adjusting to his medication  States his daughter was born the day he was admitted. Denies this as a stressor, was just overwhelmed in general    Has noticed inc in eating as fluid intake. See's [facility] for f/u in 2 days    Anxiety  Patient reports no suicidal ideas. DepressionPatient is not experiencing: suicidal ideas. Review of Systems   Psychiatric/Behavioral:  Positive for depression. Negative for agitation, hallucinations, self-injury, sleep disturbance and suicidal ideas.          Objective     DIAGNOSTIC FINDINGS:  CBC:  Lab Results   Component Value Date/Time    WBC 9.6 2023 11:34 PM    HGB 16.5 2023 11:34 PM     2023 11:34 PM       BMP:    Lab Results   Component Value Date/Time     2023 11:34 PM    K 3.6 2023 11:34 PM     2023 11:34 PM    CO2 27 2023 11:34 PM    BUN 12 2023 11:34 PM    CREATININE 0.83 2023 11:34 PM    GLUCOSE 83 2023 11:34 PM       HEMOGLOBIN A1C: No results found for: LABA1C    FASTING LIPID PANEL:No results found for: CHOL, HDL, TRIG    Physical Exam  Constitutional:       Appearance: Normal appearance. He is not toxic-appearing. HENT:      Head: Normocephalic. Right Ear: External ear normal.      Left Ear: External ear normal.      Nose: Nose normal.      Mouth/Throat:      Mouth: Mucous membranes are moist.   Eyes:      General: No scleral icterus. Right eye: No discharge. Left eye: No discharge. Conjunctiva/sclera: Conjunctivae normal.   Pulmonary:      Effort: Pulmonary effort is normal.   Musculoskeletal:      Cervical back: Normal range of motion. Skin:     Coloration: Skin is not jaundiced. Neurological:      Mental Status: He is alert and oriented to person, place, and time. Psychiatric:         Mood and Affect: Mood normal.         Behavior: Behavior normal.         Thought Content: Thought content normal.          An electronic signature was used to authenticate this note.     --Cee Castle, HEIDE - CNP

## 2023-03-14 NOTE — LETTER
March 14, 2023       Jordanmagnus Alex YOB: 2000   [address] [address] Date of Visit:  3/14/2023       To Whom It May Concern:    Harriet Arguelles was seen in my clinic on 3/14/2023. If you have any questions or concerns, please don't hesitate to call.     Sincerely,        Lc Fong, HEIDE - CNP

## 2023-10-24 ENCOUNTER — OFFICE VISIT (OUTPATIENT)
Dept: PRIMARY CARE CLINIC | Age: 23
End: 2023-10-24
Payer: COMMERCIAL

## 2023-10-24 VITALS
HEART RATE: 60 BPM | SYSTOLIC BLOOD PRESSURE: 113 MMHG | WEIGHT: 140.6 LBS | DIASTOLIC BLOOD PRESSURE: 66 MMHG | OXYGEN SATURATION: 98 % | BODY MASS INDEX: 23.4 KG/M2

## 2023-10-24 DIAGNOSIS — K52.9 GASTROENTERITIS: ICD-10-CM

## 2023-10-24 DIAGNOSIS — B34.9 VIRAL ILLNESS: ICD-10-CM

## 2023-10-24 DIAGNOSIS — F41.8 ANXIETY WITH DEPRESSION: Primary | ICD-10-CM

## 2023-10-24 PROBLEM — F41.9 ANXIETY: Status: ACTIVE | Noted: 2023-10-24

## 2023-10-24 PROBLEM — J30.9 ALLERGIC RHINITIS DUE TO ALLERGEN: Status: ACTIVE | Noted: 2023-10-24

## 2023-10-24 PROBLEM — S62.664A CLOSED NONDISPLACED FRACTURE OF DISTAL PHALANX OF RIGHT RING FINGER: Status: ACTIVE | Noted: 2022-02-15

## 2023-10-24 LAB — INFLUENZA A ANTIBODY: NORMAL

## 2023-10-24 PROCEDURE — 99214 OFFICE O/P EST MOD 30 MIN: CPT | Performed by: NURSE PRACTITIONER

## 2023-10-24 PROCEDURE — G8420 CALC BMI NORM PARAMETERS: HCPCS | Performed by: NURSE PRACTITIONER

## 2023-10-24 PROCEDURE — G8427 DOCREV CUR MEDS BY ELIG CLIN: HCPCS | Performed by: NURSE PRACTITIONER

## 2023-10-24 PROCEDURE — G8484 FLU IMMUNIZE NO ADMIN: HCPCS | Performed by: NURSE PRACTITIONER

## 2023-10-24 PROCEDURE — 87804 INFLUENZA ASSAY W/OPTIC: CPT | Performed by: NURSE PRACTITIONER

## 2023-10-24 PROCEDURE — 1036F TOBACCO NON-USER: CPT | Performed by: NURSE PRACTITIONER

## 2023-10-24 RX ORDER — BUSPIRONE HYDROCHLORIDE 10 MG/1
TABLET ORAL
Qty: 60 TABLET | Refills: 2 | Status: SHIPPED | OUTPATIENT
Start: 2023-10-24

## 2023-10-24 RX ORDER — BUSPIRONE HYDROCHLORIDE 10 MG/1
TABLET ORAL
COMMUNITY
Start: 2023-10-11 | End: 2023-10-24 | Stop reason: SDUPTHER

## 2023-10-24 NOTE — PROGRESS NOTES
[address] [facility] PRIMARY CARE  [id number] [facility] [address]  Dept: [phone]  Dept Fax: [phone]    Valentine Kang (:  2000) is a 26 y.o. male,Established patient, here for evaluation of the following chief complaint(s):  Follow-up (Patient is here today for a 4 month follow up )         ASSESSMENT/PLAN:  1. Anxiety with depression  -     busPIRone (BUSPAR) 10 MG tablet; 1 TABLET 2 TIMES DAILY, Disp-60 tablet, R-2Normal  2. Gastroenteritis  3. Viral illness  -     POCT Influenza A    High suspicion for viral illness such as sinus infection, patient without nausea or headache to suggest strep throat. We discussed possible COVID illness, however he is on day 4 of 5 and he has no high risk comorbid illness that would warrant antiviral treatment. Agreed to keep patient home today and tomorrow as he is still having fevers off and on. We will swab for influenza today, however rapid flu was negative. Encouraged to continue over-the-counter treatment for cough and congestion, push fluids. Patient verbalized understanding    Continue with current anxiety depression treatment at this time. Return in about 6 months (around 2024) for Anxiety, Depression. Subjective   SUBJECTIVE/OBJECTIVE:  Valentine Kang today for routine follow-up for anxiety and depression. Currently taking BuSpar and Lexapro and feels symptoms are well controlled. Denies any side effects to medications at this time. C/O feeling ill starting Friday with congestion and runny nose. Got worse Saturday into   +sore throat, Moist, non-productive cough and SOB. + congestion   Fever Saturday and yesterday, highest at 100 F. No HA, no N/VD.    + fatigue and body aches        Review of Systems       Objective     DIAGNOSTIC FINDINGS:  CBC:  Lab Results   Component Value Date/Time    WBC 9.6 2023 11:34 PM    HGB 16.5 2023 11:34 PM

## 2024-04-06 DIAGNOSIS — F41.8 ANXIETY WITH DEPRESSION: ICD-10-CM

## 2024-04-08 RX ORDER — ESCITALOPRAM OXALATE 10 MG/1
TABLET ORAL
Qty: 30 TABLET | Refills: 4 | Status: SHIPPED | OUTPATIENT
Start: 2024-04-08

## 2024-04-24 ENCOUNTER — OFFICE VISIT (OUTPATIENT)
Dept: PRIMARY CARE CLINIC | Age: 24
End: 2024-04-24

## 2024-04-24 VITALS
HEIGHT: 65 IN | HEART RATE: 66 BPM | WEIGHT: 139 LBS | SYSTOLIC BLOOD PRESSURE: 115 MMHG | BODY MASS INDEX: 23.16 KG/M2 | OXYGEN SATURATION: 100 % | DIASTOLIC BLOOD PRESSURE: 69 MMHG

## 2024-04-24 DIAGNOSIS — F41.8 ANXIETY WITH DEPRESSION: Primary | ICD-10-CM

## 2024-04-24 PROCEDURE — 99213 OFFICE O/P EST LOW 20 MIN: CPT | Performed by: NURSE PRACTITIONER

## 2024-04-24 RX ORDER — FLUOXETINE HYDROCHLORIDE 20 MG/1
20 CAPSULE ORAL DAILY
Qty: 30 CAPSULE | Refills: 2 | Status: SHIPPED | OUTPATIENT
Start: 2024-04-24

## 2024-04-24 SDOH — ECONOMIC STABILITY: FOOD INSECURITY: WITHIN THE PAST 12 MONTHS, THE FOOD YOU BOUGHT JUST DIDN'T LAST AND YOU DIDN'T HAVE MONEY TO GET MORE.: NEVER TRUE

## 2024-04-24 SDOH — ECONOMIC STABILITY: FOOD INSECURITY: WITHIN THE PAST 12 MONTHS, YOU WORRIED THAT YOUR FOOD WOULD RUN OUT BEFORE YOU GOT MONEY TO BUY MORE.: NEVER TRUE

## 2024-04-24 SDOH — ECONOMIC STABILITY: INCOME INSECURITY: HOW HARD IS IT FOR YOU TO PAY FOR THE VERY BASICS LIKE FOOD, HOUSING, MEDICAL CARE, AND HEATING?: NOT HARD AT ALL

## 2024-04-24 ASSESSMENT — PATIENT HEALTH QUESTIONNAIRE - PHQ9
SUM OF ALL RESPONSES TO PHQ QUESTIONS 1-9: 7
5. POOR APPETITE OR OVEREATING: SEVERAL DAYS
SUM OF ALL RESPONSES TO PHQ9 QUESTIONS 1 & 2: 2
10. IF YOU CHECKED OFF ANY PROBLEMS, HOW DIFFICULT HAVE THESE PROBLEMS MADE IT FOR YOU TO DO YOUR WORK, TAKE CARE OF THINGS AT HOME, OR GET ALONG WITH OTHER PEOPLE: SOMEWHAT DIFFICULT
7. TROUBLE CONCENTRATING ON THINGS, SUCH AS READING THE NEWSPAPER OR WATCHING TELEVISION: SEVERAL DAYS
4. FEELING TIRED OR HAVING LITTLE ENERGY: SEVERAL DAYS
3. TROUBLE FALLING OR STAYING ASLEEP: SEVERAL DAYS
SUM OF ALL RESPONSES TO PHQ QUESTIONS 1-9: 7
SUM OF ALL RESPONSES TO PHQ QUESTIONS 1-9: 7
9. THOUGHTS THAT YOU WOULD BE BETTER OFF DEAD, OR OF HURTING YOURSELF: NOT AT ALL
1. LITTLE INTEREST OR PLEASURE IN DOING THINGS: NOT AT ALL
8. MOVING OR SPEAKING SO SLOWLY THAT OTHER PEOPLE COULD HAVE NOTICED. OR THE OPPOSITE, BEING SO FIGETY OR RESTLESS THAT YOU HAVE BEEN MOVING AROUND A LOT MORE THAN USUAL: NOT AT ALL
2. FEELING DOWN, DEPRESSED OR HOPELESS: MORE THAN HALF THE DAYS
SUM OF ALL RESPONSES TO PHQ QUESTIONS 1-9: 7
6. FEELING BAD ABOUT YOURSELF - OR THAT YOU ARE A FAILURE OR HAVE LET YOURSELF OR YOUR FAMILY DOWN: SEVERAL DAYS

## 2024-04-24 NOTE — PROGRESS NOTES
[facility] PRIMARY CARE  [facility] [facility] MAIN FLOOR  [facility] [address]  Dept: [phone]  Dept Fax: [phone]    Jj Cameron (:  2000) is a 23 y.o. male,Established patient, here for evaluation of the following chief complaint(s):  Anxiety and Follow-up (6 month follow up )    Jj Cameron is a 23-year-old male here today for follow-up for anxiety and depression, last seen 10/24/2023.  Currently taking BuSpar 10 mg twice daily in conjunction with escitalopram 10 mg.    Assessment & Plan   ASSESSMENT/PLAN:  1. Anxiety with depression  -     FLUoxetine (PROZAC) 20 MG capsule; Take 1 capsule by mouth daily, Disp-30 capsule, R-2Normal    Completed MDQ with patient in the office, at this time his answers do warrant further evaluation for potential bipolar disorder.  Discussed he also may require treatment for the ADHD if he is having the agitation or impulsivity.  We did agree to stop Lexapro and switch to Prozac in the meantime to monitor for improvement in depression symptoms.  ADR reviewed  Patient was given a list of local mental health providers, he himself has private insurance starting May 1 and I have encouraged him to look into providers at network and start calling to make appointments    Return in about 6 weeks (around 2024).         Subjective   SUBJECTIVE/OBJECTIVE:  Jj is asking for medication adjustment today.  He states that the Lexapro is causing him to be groggy, even when taking it at bedtime he still feels tired after waking up and has difficulty caring for his child as he does not \"hear her\" if he is sleeping.  Despite the side effects he still feels that the depression is not well-controlled.  He was standing days where he feels great, \"on top of the world\" and has episodes where he is just down for multiple days.  Denies feeling suicidal, no hallucinations.  He admits to feeling agitated, that he feels people

## 2024-06-05 ENCOUNTER — OFFICE VISIT (OUTPATIENT)
Dept: PRIMARY CARE CLINIC | Age: 24
End: 2024-06-05

## 2024-06-05 VITALS
HEART RATE: 61 BPM | DIASTOLIC BLOOD PRESSURE: 76 MMHG | OXYGEN SATURATION: 100 % | BODY MASS INDEX: 22.13 KG/M2 | WEIGHT: 133 LBS | SYSTOLIC BLOOD PRESSURE: 117 MMHG

## 2024-06-05 DIAGNOSIS — F41.8 ANXIETY WITH DEPRESSION: Primary | ICD-10-CM

## 2024-06-05 PROCEDURE — 99213 OFFICE O/P EST LOW 20 MIN: CPT | Performed by: NURSE PRACTITIONER

## 2024-06-05 RX ORDER — FLUOXETINE HYDROCHLORIDE 20 MG/1
20 CAPSULE ORAL DAILY
Qty: 90 CAPSULE | Refills: 1 | Status: SHIPPED | OUTPATIENT
Start: 2024-06-05

## 2024-06-05 NOTE — PROGRESS NOTES
[facility]  [facility] PRIMARY CARE  [facility] MAIN FLOOR  [place] [address]  Dept: [phone]  Dept Fax: [phone]    Jj Cameron (:  2000) is a 23 y.o. male,Established patient, here for evaluation of the following chief complaint(s):  Follow-up (6 week follow up )      Assessment & Plan   ASSESSMENT/PLAN:  1. Anxiety with depression  -     FLUoxetine (PROZAC) 20 MG capsule; Take 1 capsule by mouth daily, Disp-90 capsule, R-1Normal    Continue current dosing, anxiety better controlled.  I again heavily encouraged the patient to schedule with behavioral health given his positive MDD screen.     Return in about 3 months (around 2024) for Anxiety, Depression.         Subjective   SUBJECTIVE/OBJECTIVE:  Doing well with Prozac  Not making him drowsy like Lexapro did.  Anxiety well controlled, not needing his Buspar.    Still has some \"down days\". Overall more good then bad. Less triggers at work than before.         Review of Systems   Constitutional:  Negative for fatigue.   Psychiatric/Behavioral:  Negative for agitation and hallucinations.           Objective     DIAGNOSTIC FINDINGS:  CBC:  Lab Results   Component Value Date/Time    WBC 9.6 2023 11:34 PM    HGB 16.5 2023 11:34 PM     2023 11:34 PM       BMP:    Lab Results   Component Value Date/Time     2023 11:34 PM    K 3.6 2023 11:34 PM     2023 11:34 PM    CO2 27 2023 11:34 PM    BUN 12 2023 11:34 PM    CREATININE 0.83 2023 11:34 PM    GLUCOSE 83 2023 11:34 PM       HEMOGLOBIN A1C: No results found for: \"LABA1C\"    FASTING LIPID PANEL:No results found for: \"CHOL\", \"HDL\", \"TRIG\"    Physical Exam  Constitutional:       Appearance: Normal appearance. He is not toxic-appearing.   HENT:      Head: Normocephalic.      Right Ear: External ear normal.      Left Ear: External ear normal.      Nose: Nose normal.

## 2024-07-18 ENCOUNTER — OFFICE VISIT (OUTPATIENT)
Dept: PRIMARY CARE CLINIC | Age: 24
End: 2024-07-18

## 2024-07-18 VITALS
SYSTOLIC BLOOD PRESSURE: 124 MMHG | OXYGEN SATURATION: 99 % | WEIGHT: 136 LBS | BODY MASS INDEX: 22.63 KG/M2 | DIASTOLIC BLOOD PRESSURE: 71 MMHG | HEART RATE: 65 BPM

## 2024-07-18 DIAGNOSIS — Z11.59 NEED FOR HEPATITIS C SCREENING TEST: ICD-10-CM

## 2024-07-18 DIAGNOSIS — R10.10 UPPER ABDOMINAL PAIN: Primary | ICD-10-CM

## 2024-07-18 DIAGNOSIS — R11.2 NAUSEA AND VOMITING, UNSPECIFIED VOMITING TYPE: ICD-10-CM

## 2024-07-18 PROCEDURE — 99213 OFFICE O/P EST LOW 20 MIN: CPT | Performed by: NURSE PRACTITIONER

## 2024-07-18 ASSESSMENT — ENCOUNTER SYMPTOMS
HEMATOCHEZIA: 0
HEMATEMESIS: 0
ABDOMINAL PAIN: 1
DIARRHEA: 0
VOMITING: 1
JAUNDICE: 0
NAUSEA: 1

## 2024-07-18 NOTE — PROGRESS NOTES
[facility]  [facility] PRIMARY CARE  [facility] [facility] MAIN FLOOR  [facility] [address]  Dept: [phone]  Dept Fax: [phone]    Jj Cameron (:  2000) is a 24 y.o. male,Established patient, here for evaluation of the following chief complaint(s):  GI Problem (Stomach issues )      Assessment & Plan   ASSESSMENT/PLAN:  1. Upper abdominal pain  -     H. Pylori Breath Test; Future  -     Comprehensive Metabolic Panel; Future  -     CBC with Auto Differential; Future  2. Nausea and vomiting, unspecified vomiting type  -     H. Pylori Breath Test; Future  -     Comprehensive Metabolic Panel; Future  -     CBC with Auto Differential; Future  3. Need for hepatitis C screening test  -     Hepatitis C Antibody; Future    Patient reports right upper quadrant pain and upper abdominal pain, however on palpation he has more periumbilical and left lower quadrant pain.  Differential diagnosis includes H. pylori, gallbladder pathology, diverticulitis.  Given chronicity I have lower suspicion for diverticulitis as there are no reported fevers or chills, no bloody stools.  Patient has not used any antacids, bismuth, or antibiotics in the last month.  Agreeable to H. pylori breath test.  If findings are negative, will proceed with CT of the abdomen given patient is having left lower quadrant pain in addition to reported right upper quadrant.    Patient states he has been in touch with the [facility] due to concerns for possible bipolar diagnosis, he does have to reregister and have a new evaluation for which she has not yet scheduled    Return for Follow up after labs resulted.         Subjective   SUBJECTIVE/OBJECTIVE:  Jj comes in today with concerns for abdominal symptoms that been ongoing for roughly 3 to 4 weeks.  Patient still has his gallbladder, he has no history of Crohn's disease or irritable bowel syndrome.  He has 1-2 alcoholic drinks a week, not always that

## 2025-08-01 ENCOUNTER — HOSPITAL ENCOUNTER (EMERGENCY)
Age: 25
Discharge: HOME | End: 2025-08-01
Attending: EMERGENCY MEDICINE
Payer: COMMERCIAL

## 2025-08-01 ENCOUNTER — APPOINTMENT (OUTPATIENT)
Dept: GENERAL RADIOLOGY | Age: 25
End: 2025-08-01
Payer: COMMERCIAL

## 2025-08-01 VITALS
SYSTOLIC BLOOD PRESSURE: 110 MMHG | DIASTOLIC BLOOD PRESSURE: 51 MMHG | OXYGEN SATURATION: 100 % | TEMPERATURE: 98.2 F | RESPIRATION RATE: 17 BRPM | HEART RATE: 80 BPM
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
## 2025-08-01 DIAGNOSIS — S93.402A SPRAIN OF LEFT ANKLE, UNSPECIFIED LIGAMENT, INITIAL ENCOUNTER: Primary | ICD-10-CM

## 2025-08-01 DIAGNOSIS — S20.411A ABRASION OF RIGHT SIDE OF BACK, INITIAL ENCOUNTER: ICD-10-CM

## 2025-08-01 PROCEDURE — 73610 X-RAY EXAM OF ANKLE: CPT

## 2025-08-01 PROCEDURE — 6370000000 HC RX 637 (ALT 250 FOR IP)

## 2025-08-01 PROCEDURE — 99283 EMERGENCY DEPT VISIT LOW MDM: CPT

## 2025-08-01 RX ORDER — IBUPROFEN 600 MG/1
600 TABLET, FILM COATED ORAL 4 TIMES DAILY PRN
Qty: 360 TABLET | Refills: 0 | Status: SHIPPED | OUTPATIENT
Start: 2025-08-01

## 2025-08-01 RX ORDER — OXYCODONE AND ACETAMINOPHEN 5; 325 MG/1; MG/1
1 TABLET ORAL ONCE
Refills: 0 | Status: COMPLETED | OUTPATIENT
Start: 2025-08-01 | End: 2025-08-01

## 2025-08-01 RX ADMIN — OXYCODONE HYDROCHLORIDE AND ACETAMINOPHEN 1 TABLET: 5; 325 TABLET ORAL at 21:33

## 2025-08-01 ASSESSMENT — PAIN SCALES - GENERAL: PAINLEVEL_OUTOF10: 9

## 2025-08-02 NOTE — ED NOTES
Pt to ED with c/o left ankle pain after falling. [place] and fell. Pt states he heard a popping noise. Pt denies hitting head, denies LOC. Pt states EMS was called where ankle was wrapped and he was advised to come to ED to be evaluated. Pt denies taking any medication pain. Pt A&Ox4, respirations even and unlabored, and speaking in complete sentences.

## 2025-08-02 NOTE — DISCHARGE INSTRUCTIONS
You have a sprain of the left ankle and an abrasion to your right lower back.  X-ray showed no signs of break/fractures.  Please continue to wear boot while active throughout the day and utilize crutches as needed.  Recommend follow-up with orthopedic surgery if you continue to experience pain after 1-2 weeks, contact information is provided below.  For pain you may take ibuprofen, a prescription has been provided to you.  Recommend elevating the affected extremity above the level of the heart while resting to help with swelling.  You may apply a cold compress several times daily.

## 2025-08-02 NOTE — ED PROVIDER NOTES
[facility]     Emergency Department     Faculty Attestation    I performed a history and physical examination of the patient and discussed management with the resident. I reviewed the resident’s note and agree with the documented findings and plan of care. Any areas of disagreement are noted on the chart. I was personally present for the key portions of any procedures. I have documented in the chart those procedures where I was not present during the key portions. I have reviewed the emergency nurses triage note. I agree with the chief complaint, past medical history, past surgical history, allergies, medications, social and family history as documented unless otherwise noted below. Documentation of the HPI, Physical Exam and Medical Decision Making performed by medical students or scribes is based on my personal performance of the HPI, PE and MDM. For Physician Assistant/ Nurse Practitioner cases/documentation I have personally evaluated this patient and have completed at least one if not all key elements of the E/M (history, physical exam, and MDM). Additional findings are as noted.    Vital signs:   Vitals:    08/01/25 2118   BP: (!) 110/51   Pulse: 80   Resp: 17   Temp: 98.2 °F (36.8 °C)   SpO2: 100%                Radha Figueroa M.D,  Attending Emergency  Physician           Radha Figueroa MD  08/01/25 8066     For muscle aches, fever, chills: Acetaminophen or Ibuprofen, Advil, or Aleve can be used.  Hydration: Maintain adequate hydration with water.  Nasal symptoms: Nasal Saline available over-the-counter, can be used 3-4 times per day  Decongestants reduce nasal congestion and discharge  Cool Mist Humidifier may loosens discharge  Cough Suppressants or expectorants may be taken over-the-counter such as Mucinex      Use a steroid anti-inflammatory pack as directed.  You will be started on antibiotic which will be sent to the pharmacy; please complete the regimen as directed even if your symptoms improve. It is recommended to take an Probiotic while on this medication to reduce symptoms of upset stomach, diarrhea.

## 2025-08-02 NOTE — ED PROVIDER NOTES
[facility] EMERGENCY DEPARTMENT  Emergency Department Encounter  Emergency Medicine Resident     Pt Name:Jj Cameron  MRN: [id number]  Birthdate 2000  Date of evaluation: 8/1/25  PCP:  Luana Bennett APRN - CNP  Note Started: 10:21 PM EDT      CHIEF COMPLAINT       Chief Complaint   Patient presents with    Ankle Pain       HISTORY OF PRESENT ILLNESS  (Location/Symptom, Timing/Onset, Context/Setting, Quality, Duration, Modifying Factors, Severity.)      Jj Cameron is a 25 y.o. male who presents with left ankle swelling and pain.  Patient states he was running wearing well-fitted boots when he subsequently twisted his ankle and fell.  He states he heard a loud \"pop\" or \"crunching\" sensation.  He has been unable to ambulate since that time.  Pain is primarily localized to the entire left ankle joint.  When he fell he sustained abrasions to his right lower back.  Denies midline back tenderness.  Denies hitting his head.  No reported loss of consciousness.    PAST MEDICAL / SURGICAL / SOCIAL / FAMILY HISTORY      has a past medical history of Anxiety and depression, Attention deficit hyperactivity disorder (ADHD), combined type, and History of Chiari malformation.       has no past surgical history on file.      Social History     Socioeconomic History    Marital status: Single     Spouse name: Not on file    Number of children: 0    Years of education: 12    Highest education level: High school graduate   Occupational History    Not on file   Tobacco Use    Smoking status: Never    Smokeless tobacco: Never   Vaping Use    Vaping status: Every Day    Start date: 7/24/2019    Substances: Nicotine    Devices: Pre-filled pod   Substance and Sexual Activity    Alcohol use: No    Drug use: No    Sexual activity: Yes     Partners: Female     Birth control/protection: Condom   Other Topics Concern    Not on file   Social History Narrative    ** Merged History Encounter **          Social Drivers of

## 2025-08-15 ENCOUNTER — OFFICE VISIT (OUTPATIENT)
Dept: PRIMARY CARE CLINIC | Age: 25
End: 2025-08-15
Payer: COMMERCIAL

## 2025-08-15 VITALS
HEART RATE: 61 BPM | WEIGHT: 136 LBS | BODY MASS INDEX: 22.66 KG/M2 | SYSTOLIC BLOOD PRESSURE: 128 MMHG | OXYGEN SATURATION: 100 % | TEMPERATURE: 97 F | DIASTOLIC BLOOD PRESSURE: 64 MMHG | HEIGHT: 65 IN

## 2025-08-15 DIAGNOSIS — S96.912D LEFT ANKLE STRAIN, SUBSEQUENT ENCOUNTER: Primary | ICD-10-CM

## 2025-08-15 PROCEDURE — 99213 OFFICE O/P EST LOW 20 MIN: CPT | Performed by: NURSE PRACTITIONER

## 2025-08-15 PROCEDURE — G8427 DOCREV CUR MEDS BY ELIG CLIN: HCPCS | Performed by: NURSE PRACTITIONER

## 2025-08-15 PROCEDURE — G8420 CALC BMI NORM PARAMETERS: HCPCS | Performed by: NURSE PRACTITIONER

## 2025-08-15 PROCEDURE — 1036F TOBACCO NON-USER: CPT | Performed by: NURSE PRACTITIONER

## 2025-08-15 SDOH — ECONOMIC STABILITY: FOOD INSECURITY: WITHIN THE PAST 12 MONTHS, YOU WORRIED THAT YOUR FOOD WOULD RUN OUT BEFORE YOU GOT MONEY TO BUY MORE.: NEVER TRUE

## 2025-08-15 SDOH — ECONOMIC STABILITY: FOOD INSECURITY: WITHIN THE PAST 12 MONTHS, THE FOOD YOU BOUGHT JUST DIDN'T LAST AND YOU DIDN'T HAVE MONEY TO GET MORE.: NEVER TRUE

## 2025-08-15 ASSESSMENT — PATIENT HEALTH QUESTIONNAIRE - PHQ9
SUM OF ALL RESPONSES TO PHQ QUESTIONS 1-9: 0
9. THOUGHTS THAT YOU WOULD BE BETTER OFF DEAD, OR OF HURTING YOURSELF: NOT AT ALL
8. MOVING OR SPEAKING SO SLOWLY THAT OTHER PEOPLE COULD HAVE NOTICED. OR THE OPPOSITE, BEING SO FIGETY OR RESTLESS THAT YOU HAVE BEEN MOVING AROUND A LOT MORE THAN USUAL: NOT AT ALL
SUM OF ALL RESPONSES TO PHQ QUESTIONS 1-9: 0
7. TROUBLE CONCENTRATING ON THINGS, SUCH AS READING THE NEWSPAPER OR WATCHING TELEVISION: NOT AT ALL
6. FEELING BAD ABOUT YOURSELF - OR THAT YOU ARE A FAILURE OR HAVE LET YOURSELF OR YOUR FAMILY DOWN: NOT AT ALL
1. LITTLE INTEREST OR PLEASURE IN DOING THINGS: NOT AT ALL
10. IF YOU CHECKED OFF ANY PROBLEMS, HOW DIFFICULT HAVE THESE PROBLEMS MADE IT FOR YOU TO DO YOUR WORK, TAKE CARE OF THINGS AT HOME, OR GET ALONG WITH OTHER PEOPLE: NOT DIFFICULT AT ALL
2. FEELING DOWN, DEPRESSED OR HOPELESS: NOT AT ALL
SUM OF ALL RESPONSES TO PHQ QUESTIONS 1-9: 0
3. TROUBLE FALLING OR STAYING ASLEEP: NOT AT ALL
5. POOR APPETITE OR OVEREATING: NOT AT ALL
4. FEELING TIRED OR HAVING LITTLE ENERGY: NOT AT ALL
SUM OF ALL RESPONSES TO PHQ QUESTIONS 1-9: 0